# Patient Record
Sex: FEMALE | Race: WHITE | Employment: FULL TIME | URBAN - METROPOLITAN AREA
[De-identification: names, ages, dates, MRNs, and addresses within clinical notes are randomized per-mention and may not be internally consistent; named-entity substitution may affect disease eponyms.]

---

## 2017-03-03 LAB
ANTIBODY SCREEN, EXTERNAL: NEGATIVE
CHLAMYDIA, EXTERNAL: NEGATIVE
HBSAG, EXTERNAL: NEGATIVE
HIV, EXTERNAL: NEGATIVE
N. GONORRHEA, EXTERNAL: NEGATIVE
RPR, EXTERNAL: NON REACTIVE
RUBELLA, EXTERNAL: NORMAL
TYPE, ABO & RH, EXTERNAL: NORMAL

## 2017-07-11 ENCOUNTER — OFFICE VISIT (OUTPATIENT)
Dept: SURGERY | Age: 27
End: 2017-07-11

## 2017-07-11 VITALS
DIASTOLIC BLOOD PRESSURE: 73 MMHG | WEIGHT: 187.5 LBS | HEART RATE: 87 BPM | HEIGHT: 68 IN | BODY MASS INDEX: 28.42 KG/M2 | SYSTOLIC BLOOD PRESSURE: 127 MMHG

## 2017-07-11 DIAGNOSIS — R23.4 BREAST SKIN CHANGES: Primary | ICD-10-CM

## 2017-07-11 RX ORDER — FAMOTIDINE 40 MG/1
40 TABLET, FILM COATED ORAL
COMMUNITY
End: 2022-09-27

## 2017-07-11 RX ORDER — ALPRAZOLAM 0.5 MG/1
TABLET ORAL
COMMUNITY
End: 2017-09-26

## 2017-07-11 NOTE — COMMUNICATION BODY
HISTORY OF PRESENT ILLNESS  Shannan Minaya is a 28 y.o. female. HPI   NEW patient presents for consultation at the request of Dr. Guillermo Whyte for bilateral breast rash which started after she got pregnant. She is 28 weeks pregnant. She felt a mass in 2017 which was evaluated with ultrasound (BIRADS 2) and has since resolved. The rash is worse on the RIGHT. She had a skin biopsy in 2017 which was benign. She has an intermittent burning sensation to the skin and the skin on her RIGHT breast gets \"tough\" and firm. This only occurred after pregnancy. Had a skin biopsy by [facility] in 2017 which per patient revealed focal hemorrhage with mild dermal inflammation, negative for cancer. RIGHT breast ultrasound 17 at [facility], BIRADS 2, benign. OB History      Para Term  AB Living    1         SAB TAB Ectopic Molar Multiple Live Births                 Obstetric Comments    Menarche:  14. LMP: 2016. # of Children:  0. Age at Delivery of First Child:  Due 16. Hysterectomy/oophorectomy:  NO/NO. Breast Bx:  Yes, 2017. Hx of Breast Feeding:  N/A.  BCP:  Yes,  to . Hormone therapy:  No.           Review of Systems   Constitutional: Positive for malaise/fatigue. Positive for weight gain. Eyes: Negative. Respiratory: Negative. Cardiovascular: Positive for leg swelling. Gastrointestinal: Positive for heartburn. Genitourinary: Positive for frequency. Musculoskeletal: Negative. Skin: Positive for rash. Neurological: Positive for headaches. Endo/Heme/Allergies: Bruises/bleeds easily. Psychiatric/Behavioral: The patient is nervous/anxious. Physical Exam   Pulmonary/Chest: Right breast exhibits no mass, no nipple discharge and no tenderness. Skin change: RIGHT > LEFT,  the skin is thick and slightly discolored. Left breast exhibits skin change.  Left breast exhibits no mass, no nipple discharge and no tenderness. Breasts are symmetrical.       Lymphadenopathy:     She has no cervical adenopathy. She has no axillary adenopathy. Right: No supraclavicular adenopathy present. Left: No supraclavicular adenopathy present. ASSESSMENT and PLAN    ICD-10-CM ICD-9-CM    1. Breast skin changes R23.4 782.8      Bilateral breast skin changes. No worrisome finding on physical exam or ultrasound. I think these changes are due to hormonal breast changes with pregnancy.   PRN follow up

## 2017-07-11 NOTE — PATIENT INSTRUCTIONS
Breast Lumps: Care Instructions  Your Care Instructions  Breast lumps are common, especially in women between ages 27 and 48. Many womens breasts feel lumpy and tender before their menstrual period. Women also may have lumps when they are breastfeeding. Breast lumps may go away after menopause. All new breast lumps in women after menopause should be checked by a doctor. Although lumps may be normal for you, it is important to have your doctor check any lump or thickness that is not like the rest of your breast to make sure it is not cancer. A lump may be larger, harder, or different from the rest of your breast tissue. Follow-up care is a key part of your treatment and safety. Be sure to make and go to all appointments, and call your doctor if you are having problems. Its also a good idea to know your test results and keep a list of the medicines you take. How can you care for yourself at home? · Make an appointment to have a mammogram and other follow-up visits as recommended by your doctor. When should you call for help? Call your doctor now or seek immediate medical care if:  · You have new changes in a breast, such as:  ¨ A lump or thickening in your breast or armpit. ¨ A change in the breast's size or shape. ¨ Skin changes, such as dimples or puckers. ¨ Nipple discharge. ¨ A change in the color or feel of the skin of your breast or the darker area around the nipple (areola). ¨ A change in the shape of the nipple (it may look like it's being pulled into the breast). · You have symptoms of a breast infection, such as:  ¨ Increased pain, swelling, redness, or warmth around a breast.  ¨ Red streaks extending from the breast.  ¨ Pus draining from a breast.  ¨ A fever. Watch closely for changes in your health, and be sure to contact your doctor if:  · You do not get better as expected. Where can you learn more? Go to http://riley-radha.info/.   Enter D305 in the search box to learn more about \"Breast Lumps: Care Instructions. \"  Current as of: October 13, 2016  Content Version: 11.3  © 7647-5072 [employer]. Care instructions adapted under license by [employer] (which disclaims liability or warranty for this information). If you have questions about a medical condition or this instruction, always ask your healthcare professional. [address] any warranty or liability for your use of this information.

## 2017-07-11 NOTE — PROGRESS NOTES
HISTORY OF PRESENT ILLNESS  Willie Peterson is a 28 y.o. female. HPI   NEW patient presents for consultation at the request of Dr. Alberta Ruvalcaba for bilateral breast rash which started after she got pregnant. She is 28 weeks pregnant. She felt a mass in 2017 which was evaluated with ultrasound (BIRADS 2) and has since resolved. The rash is worse on the RIGHT. She had a skin biopsy in 2017 which was benign. She has an intermittent burning sensation to the skin and the skin on her RIGHT breast gets \"tough\" and firm. This only occurred after pregnancy. Had a skin biopsy by [facility] in 2017 which per patient revealed focal hemorrhage with mild dermal inflammation, negative for cancer. RIGHT breast ultrasound 17 at [facility], BIRADS 2, benign. OB History      Para Term  AB Living    1         SAB TAB Ectopic Molar Multiple Live Births                 Obstetric Comments    Menarche:  14. LMP: 2016. # of Children:  0. Age at Delivery of First Child:  Due 16. Hysterectomy/oophorectomy:  NO/NO. Breast Bx:  Yes, 2017. Hx of Breast Feeding:  N/A.  BCP:  Yes,  to . Hormone therapy:  No.           Review of Systems   Constitutional: Positive for malaise/fatigue. Positive for weight gain. Eyes: Negative. Respiratory: Negative. Cardiovascular: Positive for leg swelling. Gastrointestinal: Positive for heartburn. Genitourinary: Positive for frequency. Musculoskeletal: Negative. Skin: Positive for rash. Neurological: Positive for headaches. Endo/Heme/Allergies: Bruises/bleeds easily. Psychiatric/Behavioral: The patient is nervous/anxious. Physical Exam   Pulmonary/Chest: Right breast exhibits no mass, no nipple discharge and no tenderness. Skin change: RIGHT > LEFT,  the skin is thick and slightly discolored. Left breast exhibits skin change.  Left breast exhibits no mass, no nipple discharge and no tenderness. Breasts are symmetrical.       Lymphadenopathy:     She has no cervical adenopathy. She has no axillary adenopathy. Right: No supraclavicular adenopathy present. Left: No supraclavicular adenopathy present. ASSESSMENT and PLAN    ICD-10-CM ICD-9-CM    1. Breast skin changes R23.4 782.8      Bilateral breast skin changes. No worrisome finding on physical exam or ultrasound. I think these changes are due to hormonal breast changes with pregnancy.   PRN follow up

## 2017-09-06 LAB — GRBS, EXTERNAL: POSITIVE

## 2017-09-21 ENCOUNTER — HOSPITAL ENCOUNTER (OUTPATIENT)
Age: 27
Discharge: HOME | End: 2017-09-21
Attending: OBSTETRICS & GYNECOLOGY | Admitting: OBSTETRICS & GYNECOLOGY
Payer: COMMERCIAL

## 2017-09-21 VITALS
WEIGHT: 207 LBS | RESPIRATION RATE: 16 BRPM | SYSTOLIC BLOOD PRESSURE: 122 MMHG | DIASTOLIC BLOOD PRESSURE: 84 MMHG | BODY MASS INDEX: 31.37 KG/M2 | HEIGHT: 68 IN | TEMPERATURE: 98 F | HEART RATE: 86 BPM

## 2017-09-21 PROBLEM — O99.891 BACK PAIN AFFECTING PREGNANCY IN THIRD TRIMESTER: Status: ACTIVE | Noted: 2017-09-21

## 2017-09-21 PROBLEM — M54.9 BACK PAIN AFFECTING PREGNANCY IN THIRD TRIMESTER: Status: ACTIVE | Noted: 2017-09-21

## 2017-09-21 LAB
APPEARANCE UR: CLEAR
BACTERIA URNS QL MICRO: ABNORMAL /HPF
BILIRUB UR QL: NEGATIVE
COLOR UR: ABNORMAL
EPITH CASTS URNS QL MICRO: ABNORMAL /LPF
GLUCOSE UR STRIP.AUTO-MCNC: NEGATIVE MG/DL
HGB UR QL STRIP: NEGATIVE
KETONES UR QL STRIP.AUTO: 15 MG/DL
LEUKOCYTE ESTERASE UR QL STRIP.AUTO: ABNORMAL
NITRITE UR QL STRIP.AUTO: NEGATIVE
PH UR STRIP: 6.5 [PH] (ref 5–8)
PROT UR STRIP-MCNC: NEGATIVE MG/DL
RBC #/AREA URNS HPF: ABNORMAL /HPF (ref 0–5)
SP GR UR REFRACTOMETRY: 1 (ref 1–1.03)
UA: UC IF INDICATED,UAUC: ABNORMAL
UROBILINOGEN UR QL STRIP.AUTO: 0.2 EU/DL (ref 0.2–1)
WBC URNS QL MICRO: ABNORMAL /HPF (ref 0–4)

## 2017-09-21 PROCEDURE — 87086 URINE CULTURE/COLONY COUNT: CPT | Performed by: OBSTETRICS & GYNECOLOGY

## 2017-09-21 PROCEDURE — 81001 URINALYSIS AUTO W/SCOPE: CPT | Performed by: OBSTETRICS & GYNECOLOGY

## 2017-09-21 PROCEDURE — 74011250637 HC RX REV CODE- 250/637: Performed by: OBSTETRICS & GYNECOLOGY

## 2017-09-21 PROCEDURE — 99285 EMERGENCY DEPT VISIT HI MDM: CPT

## 2017-09-21 RX ORDER — CYCLOBENZAPRINE HCL 10 MG
5 TABLET ORAL
Qty: 15 TAB | Refills: 0 | Status: SHIPPED | OUTPATIENT
Start: 2017-09-21 | End: 2017-09-26

## 2017-09-21 RX ORDER — CYCLOBENZAPRINE HCL 10 MG
5 TABLET ORAL
Status: DISCONTINUED | OUTPATIENT
Start: 2017-09-21 | End: 2017-09-21 | Stop reason: HOSPADM

## 2017-09-21 RX ADMIN — CYCLOBENZAPRINE HYDROCHLORIDE 5 MG: 10 TABLET, FILM COATED ORAL at 17:53

## 2017-09-21 NOTE — H&P
History & Physical    Name: Katelyn Hong MRN: [id number]  SSN: [id number]    YOB: 1990  Age: 28 y.o. Sex: female        Subjective:     Estimated Date of Delivery: 17  OB History    Para Term  AB Living   1        SAB TAB Ectopic Molar Multiple Live Births              # Outcome Date GA Lbr Kvng/2nd Weight Sex Delivery Anes PTL Lv   1 Current               Obstetric Comments   Menarche:  14. LMP: 2016. # of Children:  0. Age at Delivery of First Child:  Due 16. Hysterectomy/oophorectomy:  NO/NO. Breast Bx:  Yes, 2017. Hx of Breast Feeding:  N/A.  BCP:  Yes,  to . Hormone therapy:  No.        Ms. Nafisa Fitzpatrick is admitted with pregnancy at 38w6d for possible labor. C/o R spasm of back. Prenatal course is noteable for breech baby. Please see prenatal records for details. Past Medical History:   Diagnosis Date    MRSA (methicillin resistant staph aureus) culture positive     Psychiatric problem     anxiety - not currently medicated    Syncope      Past Surgical History:   Procedure Laterality Date    HX OTHER SURGICAL      wisdom teeth removed     Social History     Occupational History    Not on file. Social History Main Topics    Smoking status: Never Smoker    Smokeless tobacco: Never Used    Alcohol use No    Drug use: No    Sexual activity: Yes     Partners: Male     Birth control/ protection: None     Family History   Problem Relation Age of Onset    Hypertension Father        Allergies   Allergen Reactions    Bactrim [Sulfamethoprim] Rash     Prior to Admission medications    Medication Sig Start Date End Date Taking? Authorizing Provider   PNV no.106-iron-folate no6-dha 27.5 mg iron- 1 mg cap Take  by mouth. Yes Historical Provider   famotidine (PEPCID) 40 mg tablet Take 40 mg by mouth daily. Yes Historical Provider   ALPRAZolam Alfornia Hettinger) 0.5 mg tablet Take  by mouth.     Historical Provider        Review of Systems: A comprehensive review of systems was negative except for that written in the HPI. Objective:     Vitals:  Vitals:    17 1542 17 1550   BP:  122/84   Pulse:  86   Resp:  16   Temp:  98 °F (36.7 °C)   Weight: 93.9 kg (207 lb)    Height: 5' 8\" (1.727 m)         Physical Exam:  Patient without distress. Back: costovertebral angle tenderness absent  Abdomen: soft, nontender  Fundus: soft and non tender  Perineum: blood absent, amniotic fluid absent  Cervical Exam: ftp/long/high  Lower Extremities:  - Edema No  Membranes:  Intact  Fetal Heart Rate: 150s mod +accels no decels  Rillito: irregular ctx  Bedside US breech  Prenatal Labs:   No results found for: ABORH, RUBELLAEXT, GRBSEXT, HBSAGEXT, HIVEXT, RPREXT, GONNOEXT, CHLAMEXT, ABORHEXT, RUBELLAEXT, GRBSEXT, HBSAGEXT, HIVEXT, RPREXT, GONNOEXT, CHLAMEXT    No results found for this or any previous visit (from the past 12 hour(s)). Assessment/Plan:     Active Problems:    Back pain affecting pregnancy in third trimester (2017)         Plan:  38w6d with back spasm - will try flexeril. Encouraged heat/ice. Labor precautions reviewed. FHT reassuring. UA pending - if WNL plan for discharge under current plan.   Signed By:  Amirah Urbina MD     2017

## 2017-09-21 NOTE — DISCHARGE INSTRUCTIONS
Backache During Pregnancy: Care Instructions  Your Care Instructions  Back pain has many possible causes. It is often caused by problems with muscles and ligaments in your back. The extra weight during pregnancy can put stress on your back. Moving, lifting, standing, sitting, or sleeping in an awkward way also can strain your back. Back pain can also be a sign of labor. Although it may hurt a lot, back pain often improves on its own. Use good home treatment, and take care not to stress your back. Follow-up care is a key part of your treatment and safety. Be sure to make and go to all appointments, and call your doctor if you are having problems. It's also a good idea to know your test results and keep a list of the medicines you take. How can you care for yourself at home? · Ask your doctor about taking acetaminophen (Tylenol) for pain. Do not take aspirin, ibuprofen (Advil, Motrin), or naproxen (Aleve). · Do not take two or more pain medicines at the same time unless the doctor told you to. Many pain medicines have acetaminophen, which is Tylenol. Too much acetaminophen (Tylenol) can be harmful. · Lie on your side with your knees and hips bent and a pillow between your legs. This reduces stress on your back. · Put ice or cold packs on your back for 10 to 20 minutes at a time, several times a day. Put a thin cloth between the ice and your skin. · Warm baths may also help reduce pain. · Change positions every 30 minutes. Take breaks if you must sit for a long time. Get up and walk around. · Ask your doctor about how much exercise you can do. You may feel better taking short walks or doing gentle movements and stretching in a swimming pool. · Ask your doctor about exercises to stretch and strengthen your back. When should you call for help? Call your doctor now or seek immediate medical care if:  · You have had regular contractions (with or without pain) for an hour.  This means that you have 8 or more within 1 hour or 4 or more in 20 minutes after you change your position and drink fluids. · You have new numbness in your buttocks, genital or rectal areas, or legs. · You have a new loss of bowel or bladder control. · You have new or worse back pain. Watch closely for changes in your health, and be sure to contact your doctor if:  · You do not get better as expected. Where can you learn more? Go to http://riley-radha.info/. Enter X761 in the search box to learn more about \"Backache During Pregnancy: Care Instructions. \"  Current as of: March 16, 2017  Content Version: 11.3  © 7317-8811 [employer]. Care instructions adapted under license by [employer] (which disclaims liability or warranty for this information). If you have questions about a medical condition or this instruction, always ask your healthcare professional. [place] [address] any warranty or liability for your use of this information. DISCHARGE SUMMARY from Nurse    The following personal items are in your possession at time of discharge:    Dental Appliances: None  Visual Aid: Contacts, With patient, Glasses     Home Medications: None  Jewelry: Earrings, With patient  Clothing: Undergarments, Footwear, Shirt, Pants, With patient  Other Valuables: Cell Phone, Connie Chamberlain, With patient  Personal Items Sent to Safe: none          PATIENT INSTRUCTIONS:    After general anesthesia or intravenous sedation, for 24 hours or while taking prescription Narcotics:  · Limit your activities  · Do not drive and operate hazardous machinery  · Do not make important personal or business decisions  · Do  not drink alcoholic beverages  · If you have not urinated within 8 hours after discharge, please contact your surgeon on call.     Report the following to your surgeon:  · Excessive pain, swelling, redness or odor of or around the surgical area  · Temperature over 100.5  · Nausea and vomiting lasting longer than 4 hours or if unable to take medications  · Any signs of decreased circulation or nerve impairment to extremity: change in color, persistent  numbness, tingling, coldness or increase pain  · Any questions        What to do at Home:  Recommended activity: Activity as tolerated,       *  Please give a list of your current medications to your Primary Care Provider. *  Please update this list whenever your medications are discontinued, doses are      changed, or new medications (including over-the-counter products) are added. *  Please carry medication information at all times in case of emergency situations. These are general instructions for a healthy lifestyle:    No smoking/ No tobacco products/ Avoid exposure to second hand smoke    Surgeon General's Warning:  Quitting smoking now greatly reduces serious risk to your health. Obesity, smoking, and sedentary lifestyle greatly increases your risk for illness    A healthy diet, regular physical exercise & weight monitoring are important for maintaining a healthy lifestyle    You may be retaining fluid if you have a history of heart failure or if you experience any of the following symptoms:  Weight gain of 3 pounds or more overnight or 5 pounds in a week, increased swelling in our hands or feet or shortness of breath while lying flat in bed. Please call your doctor as soon as you notice any of these symptoms; do not wait until your next office visit. Recognize signs and symptoms of STROKE:    F-face looks uneven    A-arms unable to move or move unevenly    S-speech slurred or non-existent    T-time-call 911 as soon as signs and symptoms begin-DO NOT go       Back to bed or wait to see if you get better-TIME IS BRAIN. Warning Signs of HEART ATTACK     Call 911 if you have these symptoms:   Chest discomfort.  Most heart attacks involve discomfort in the center of the chest that lasts more than a few minutes, or that goes away and comes back. It can feel like uncomfortable pressure, squeezing, fullness, or pain.  Discomfort in other areas of the upper body. Symptoms can include pain or discomfort in one or both arms, the back, neck, jaw, or stomach.  Shortness of breath with or without chest discomfort.  Other signs may include breaking out in a cold sweat, nausea, or lightheadedness. Don't wait more than five minutes to call 911 - MINUTES MATTER! Fast action can save your life. Calling 911 is almost always the fastest way to get lifesaving treatment. Emergency Medical Services staff can begin treatment when they arrive -- up to an hour sooner than if someone gets to the hospital by car. The discharge information has been reviewed with the patient. The patient verbalized understanding. Discharge medications reviewed with the patient and appropriate educational materials and side effects teaching were provided.

## 2017-09-21 NOTE — IP AVS SNAPSHOT
[address] 
[phone] Patient: Jose White MRN: [id number] Current Discharge Medication List  
  
START taking these medications Dose & Instructions Dispensing Information Comments Morning Noon Evening Bedtime  
 cyclobenzaprine 10 mg tablet Commonly known as:  FLEXERIL Your last dose was: Your next dose is:    
   
   
 Dose:  5 mg Take 0.5 Tabs by mouth three (3) times daily as needed for Muscle Spasm(s). Quantity:  15 Tab Refills:  0 CONTINUE these medications which have NOT CHANGED Dose & Instructions Dispensing Information Comments Morning Noon Evening Bedtime PEPCID 40 mg tablet Generic drug:  famotidine Your last dose was: Your next dose is:    
   
   
 Dose:  40 mg Take 40 mg by mouth daily. Refills:  0  
     
   
   
   
  
 PNV no.106-iron-folate no6-dha 27.5 mg iron- 1 mg Cap Your last dose was: Your next dose is: Take  by mouth. Refills:  0  
     
   
   
   
  
 XANAX 0.5 mg tablet Generic drug:  ALPRAZolam  
   
Your last dose was: Your next dose is: Take  by mouth. Refills:  0 Where to Get Your Medications Information on where to get these meds will be given to you by the nurse or doctor. ! Ask your nurse or doctor about these medications  
  cyclobenzaprine 10 mg tablet

## 2017-09-21 NOTE — IP AVS SNAPSHOT
[address] 
[phone] Patient: Arden Ibrahim MRN: [id number] ICY: You are allergic to the following Allergen Reactions Bactrim (Sulfamethoprim) Rash Recent Documentation Height Weight Breastfeeding? BMI OB Status Smoking Status 1.727 m 93.9 kg No 31.47 kg/m2 Pregnant Never Smoker Unresulted Labs Order Current Status CULTURE, MRSA In process URINALYSIS W/ REFLEX CULTURE Preliminary result Emergency Contacts Name Discharge Info Relation Home Work Mobile Jose Castilloist  [place] [17] [phone] About your hospitalization You were admitted on:  2017 You last received care in the:  [address] [place] [address] You were discharged on:  2017 Unit phone number:  [phone] Why you were hospitalized Your primary diagnosis was:  Not on File Your diagnoses also included:  Back Pain Affecting Pregnancy In Third Trimester Providers Seen During Your Hospitalizations Provider Role Specialty Primary office phone Yomaira Garrison [place] Attending Provider Obstetrics & Gynecology [phone] Your Primary Care Physician (PCP) Primary Care Physician Office Phone Office Fax Michiel Curling [phone] Follow-up Information Follow up With Details Comments Contact Info BESSY Mina   [facility] [address] [place] [address] 
[phone] Your Appointments 2017  8:00 AM EDT  
L&D PAT with [facility] L&D PAT  
[facility] LD PAT SHADOW ([address]) [address]  
[phone] 2017  SECTION with Tamia Pavon. Lambert Garrison MD  
[facility] 3 LABOR & DELIVERY (--)  
 [address]  
[phone] Current Discharge Medication List  
  
 START taking these medications Dose & Instructions Dispensing Information Comments Morning Noon Evening Bedtime  
 cyclobenzaprine 10 mg tablet Commonly known as:  FLEXERIL Your last dose was: Your next dose is:    
   
   
 Dose:  5 mg Take 0.5 Tabs by mouth three (3) times daily as needed for Muscle Spasm(s). Quantity:  15 Tab Refills:  0 CONTINUE these medications which have NOT CHANGED Dose & Instructions Dispensing Information Comments Morning Noon Evening Bedtime PEPCID 40 mg tablet Generic drug:  famotidine Your last dose was: Your next dose is:    
   
   
 Dose:  40 mg Take 40 mg by mouth daily. Refills:  0  
     
   
   
   
  
 PNV no.106-iron-folate no6-dha 27.5 mg iron- 1 mg Cap Your last dose was: Your next dose is: Take  by mouth. Refills:  0  
     
   
   
   
  
 XANAX 0.5 mg tablet Generic drug:  ALPRAZolam  
   
Your last dose was: Your next dose is: Take  by mouth. Refills:  0 Where to Get Your Medications Information on where to get these meds will be given to you by the nurse or doctor. ! Ask your nurse or doctor about these medications  
  cyclobenzaprine 10 mg tablet Discharge Instructions Backache During Pregnancy: Care Instructions Your Care Instructions Back pain has many possible causes. It is often caused by problems with muscles and ligaments in your back. The extra weight during pregnancy can put stress on your back. Moving, lifting, standing, sitting, or sleeping in an awkward way also can strain your back. Back pain can also be a sign of labor. Although it may hurt a lot, back pain often improves on its own. Use good home treatment, and take care not to stress your back. Follow-up care is a key part of your treatment and safety.  Be sure to make and go to all appointments, and call your doctor if you are having problems. It's also a good idea to know your test results and keep a list of the medicines you take. How can you care for yourself at home? · Ask your doctor about taking acetaminophen (Tylenol) for pain. Do not take aspirin, ibuprofen (Advil, Motrin), or naproxen (Aleve). · Do not take two or more pain medicines at the same time unless the doctor told you to. Many pain medicines have acetaminophen, which is Tylenol. Too much acetaminophen (Tylenol) can be harmful. · Lie on your side with your knees and hips bent and a pillow between your legs. This reduces stress on your back. · Put ice or cold packs on your back for 10 to 20 minutes at a time, several times a day. Put a thin cloth between the ice and your skin. · Warm baths may also help reduce pain. · Change positions every 30 minutes. Take breaks if you must sit for a long time. Get up and walk around. · Ask your doctor about how much exercise you can do. You may feel better taking short walks or doing gentle movements and stretching in a swimming pool. · Ask your doctor about exercises to stretch and strengthen your back. When should you call for help? Call your doctor now or seek immediate medical care if: 
· You have had regular contractions (with or without pain) for an hour. This means that you have 8 or more within 1 hour or 4 or more in 20 minutes after you change your position and drink fluids. · You have new numbness in your buttocks, genital or rectal areas, or legs. · You have a new loss of bowel or bladder control. · You have new or worse back pain. Watch closely for changes in your health, and be sure to contact your doctor if: 
· You do not get better as expected. Where can you learn more? Go to http://riley-radha.info/. Enter I010 in the search box to learn more about \"Backache During Pregnancy: Care Instructions. \" 
 Current as of: March 16, 2017 Content Version: 11.3 © 9365-5264 [employer]. Care instructions adapted under license by [employer] (which disclaims liability or warranty for this information). If you have questions about a medical condition or this instruction, always ask your healthcare professional. [address] any warranty or liability for your use of this information. DISCHARGE SUMMARY from Nurse The following personal items are in your possession at time of discharge: 
 
Dental Appliances: None Visual Aid: Contacts, With patient, Glasses Home Medications: None Jewelry: Earrings, With patient Clothing: Undergarments, Footwear, Shirt, Pants, With patient Other Valuables: Cell Phone, [employer], With patient Personal Items Sent to Safe: none PATIENT INSTRUCTIONS: 
 
 
F-face looks uneven A-arms unable to move or move unevenly S-speech slurred or non-existent T-time-call 911 as soon as signs and symptoms begin-DO NOT go Back to bed or wait to see if you get better-TIME IS BRAIN. Warning Signs of HEART ATTACK Call 911 if you have these symptoms: 
? Chest discomfort. Most heart attacks involve discomfort in the center of the chest that lasts more than a few minutes, or that goes away and comes back. It can feel like uncomfortable pressure, squeezing, fullness, or pain. ? Discomfort in other areas of the upper body. Symptoms can include pain or discomfort in one or both arms, the back, neck, jaw, or stomach. ? Shortness of breath with or without chest discomfort. ? Other signs may include breaking out in a cold sweat, nausea, or lightheadedness. Don't wait more than five minutes to call [address]! Fast action can save your life.  Calling 911 is almost always the fastest way to get lifesaving treatment. Emergency Medical Services staff can begin treatment when they arrive  up to an hour sooner than if someone gets to the hospital by car. The discharge information has been reviewed with the patient. The patient verbalized understanding. Discharge medications reviewed with the patient and appropriate educational materials and side effects teaching were provided. Discharge Orders None Introducing [facility] SERVICES! Brent Delvalle introduces iJoule patient portal. Now you can access parts of your medical record, email your doctor's office, and request medication refills online. 1. In your internet browser, go to [email or web address] 2. Click on the First Time User? Click Here link in the Sign In box. You will see the New Member Sign Up page. 3. Enter your iJoule Access Code exactly as it appears below. You will not need to use this code after youve completed the sign-up process. If you do not sign up before the expiration date, you must request a new code. · iJ[employer]le Access Code: [email or web address] Expires: 10/9/2017  2:51 PM 
 
4. Enter the last four digits of your Social Security Number (xxxx) and Date of Birth (mm/dd/yyyy) as indicated and click Submit. You will be taken to the next sign-up page. 5. Create a iJoule ID. This will be your iJoule login ID and cannot be changed, so think of one that is secure and easy to remember. 6. Create a iJoule password. You can change your password at any time. 7. Enter your Password Reset Question and Answer. This can be used at a later time if you forget your password. 8. Enter your e-mail address. You will receive e-mail notification when new information is available in [address] 9. Click Sign Up. You can now view and download portions of your medical record. 10. Click the Download Summary menu link to download a portable copy of your medical information. If you have questions, please visit the Frequently Asked Questions section of the MyChart website. Remember, MyChart is NOT to be used for urgent needs. For medical emergencies, dial 911. Now available from your iPhone and Android! General Information Please provide this summary of care documentation to your next provider. Patient Signature:  ____________________________________________________________ Date:  ____________________________________________________________  
  
[facility] Provider Signature:  ____________________________________________________________ Date:  ____________________________________________________________

## 2017-09-21 NOTE — PROGRESS NOTES
1527 Pt admitted to LDR 8 with c/o of low back back and general feeling of discomfort. Pain has increased in severity throughout the day, concentrated on her right flank. 26 Dr Dion Wolff at bedside to assess. Breech confirmed via bedside US. 7 [facility] reviewed by Dr Dion Wolff. Ok to d/c pt to home. 1805 Pt given d/c instructions. Verbalized understanding. 1807 Pt left unit ambulatory with .

## 2017-09-22 LAB
BACTERIA SPEC CULT: NORMAL
BACTERIA SPEC CULT: NORMAL
SERVICE CMNT-IMP: NORMAL

## 2017-09-23 ENCOUNTER — HOSPITAL ENCOUNTER (INPATIENT)
Age: 27
LOS: 3 days | Discharge: HOME | End: 2017-09-26
Attending: OBSTETRICS & GYNECOLOGY | Admitting: OBSTETRICS & GYNECOLOGY
Payer: COMMERCIAL

## 2017-09-23 ENCOUNTER — ANESTHESIA EVENT (OUTPATIENT)
Dept: LABOR AND DELIVERY | Age: 27
End: 2017-09-23
Payer: COMMERCIAL

## 2017-09-23 ENCOUNTER — ANESTHESIA (OUTPATIENT)
Dept: LABOR AND DELIVERY | Age: 27
End: 2017-09-23
Payer: COMMERCIAL

## 2017-09-23 LAB
BACTERIA SPEC CULT: NORMAL
CC UR VC: NORMAL
ERYTHROCYTE [DISTWIDTH] IN BLOOD BY AUTOMATED COUNT: 13.9 % (ref 11.5–14.5)
HCT VFR BLD AUTO: 43.9 % (ref 35–47)
HGB BLD-MCNC: 15.3 G/DL (ref 11.5–16)
MCH RBC QN AUTO: 32 PG (ref 26–34)
MCHC RBC AUTO-ENTMCNC: 34.9 G/DL (ref 30–36.5)
MCV RBC AUTO: 91.8 FL (ref 80–99)
PLATELET # BLD AUTO: 230 K/UL (ref 150–400)
RBC # BLD AUTO: 4.78 M/UL (ref 3.8–5.2)
SERVICE CMNT-IMP: NORMAL
WBC # BLD AUTO: 16.9 K/UL (ref 3.6–11)

## 2017-09-23 PROCEDURE — 85027 COMPLETE CBC AUTOMATED: CPT | Performed by: OBSTETRICS & GYNECOLOGY

## 2017-09-23 PROCEDURE — 74011250636 HC RX REV CODE- 250/636

## 2017-09-23 PROCEDURE — 86870 RBC ANTIBODY IDENTIFICATION: CPT | Performed by: OBSTETRICS & GYNECOLOGY

## 2017-09-23 PROCEDURE — 75410000003 HC RECOV DEL/VAG/CSECN EA 0.5 HR: Performed by: OBSTETRICS & GYNECOLOGY

## 2017-09-23 PROCEDURE — 86921 COMPATIBILITY TEST INCUBATE: CPT | Performed by: OBSTETRICS & GYNECOLOGY

## 2017-09-23 PROCEDURE — 4A1H74Z MONITORING OF PRODUCTS OF CONCEPTION, CARDIAC ELECTRICAL ACTIVITY, VIA NATURAL OR ARTIFICIAL OPENING: ICD-10-PCS | Performed by: OBSTETRICS & GYNECOLOGY

## 2017-09-23 PROCEDURE — 77030012890

## 2017-09-23 PROCEDURE — 86900 BLOOD TYPING SEROLOGIC ABO: CPT | Performed by: OBSTETRICS & GYNECOLOGY

## 2017-09-23 PROCEDURE — 77030011943

## 2017-09-23 PROCEDURE — 74011000250 HC RX REV CODE- 250

## 2017-09-23 PROCEDURE — 74011250636 HC RX REV CODE- 250/636: Performed by: ANESTHESIOLOGY

## 2017-09-23 PROCEDURE — 86922 COMPATIBILITY TEST ANTIGLOB: CPT | Performed by: OBSTETRICS & GYNECOLOGY

## 2017-09-23 PROCEDURE — 74011250637 HC RX REV CODE- 250/637: Performed by: OBSTETRICS & GYNECOLOGY

## 2017-09-23 PROCEDURE — 77030034849

## 2017-09-23 PROCEDURE — 76060000078 HC EPIDURAL ANESTHESIA: Performed by: OBSTETRICS & GYNECOLOGY

## 2017-09-23 PROCEDURE — 77030007866 HC KT SPN ANES BBMI -B: Performed by: ANESTHESIOLOGY

## 2017-09-23 PROCEDURE — 36415 COLL VENOUS BLD VENIPUNCTURE: CPT | Performed by: OBSTETRICS & GYNECOLOGY

## 2017-09-23 PROCEDURE — 86920 COMPATIBILITY TEST SPIN: CPT | Performed by: OBSTETRICS & GYNECOLOGY

## 2017-09-23 PROCEDURE — 76010000392 HC C SECN EA ADDL 0.5 HR: Performed by: OBSTETRICS & GYNECOLOGY

## 2017-09-23 PROCEDURE — 74011250636 HC RX REV CODE- 250/636: Performed by: OBSTETRICS & GYNECOLOGY

## 2017-09-23 PROCEDURE — 65410000002 HC RM PRIVATE OB

## 2017-09-23 PROCEDURE — 76010000391 HC C SECN FIRST 1 HR: Performed by: OBSTETRICS & GYNECOLOGY

## 2017-09-23 RX ORDER — TERBUTALINE SULFATE 1 MG/ML
0.25 INJECTION SUBCUTANEOUS AS NEEDED
Status: DISCONTINUED | OUTPATIENT
Start: 2017-09-23 | End: 2017-09-23 | Stop reason: HOSPADM

## 2017-09-23 RX ORDER — OXYTOCIN/RINGER'S LACTATE 20/1000 ML
125-1000 PLASTIC BAG, INJECTION (ML) INTRAVENOUS
Status: DISCONTINUED | OUTPATIENT
Start: 2017-09-23 | End: 2017-09-23 | Stop reason: HOSPADM

## 2017-09-23 RX ORDER — ONDANSETRON 2 MG/ML
4 INJECTION INTRAMUSCULAR; INTRAVENOUS ONCE
Status: ACTIVE | OUTPATIENT
Start: 2017-09-23 | End: 2017-09-23

## 2017-09-23 RX ORDER — KETOROLAC TROMETHAMINE 30 MG/ML
30 INJECTION, SOLUTION INTRAMUSCULAR; INTRAVENOUS
Status: DISPENSED | OUTPATIENT
Start: 2017-09-23 | End: 2017-09-24

## 2017-09-23 RX ORDER — SODIUM CHLORIDE, SODIUM LACTATE, POTASSIUM CHLORIDE, CALCIUM CHLORIDE 600; 310; 30; 20 MG/100ML; MG/100ML; MG/100ML; MG/100ML
INJECTION, SOLUTION INTRAVENOUS
Status: DISCONTINUED | OUTPATIENT
Start: 2017-09-23 | End: 2017-09-23 | Stop reason: HOSPADM

## 2017-09-23 RX ORDER — MORPHINE SULFATE 10 MG/ML
6 INJECTION, SOLUTION INTRAMUSCULAR; INTRAVENOUS
Status: DISCONTINUED | OUTPATIENT
Start: 2017-09-23 | End: 2017-09-26 | Stop reason: HOSPADM

## 2017-09-23 RX ORDER — SODIUM CHLORIDE 0.9 % (FLUSH) 0.9 %
SYRINGE (ML) INJECTION
Status: COMPLETED
Start: 2017-09-23 | End: 2017-09-23

## 2017-09-23 RX ORDER — METHYLERGONOVINE MALEATE 0.2 MG/ML
INJECTION INTRAVENOUS AS NEEDED
Status: DISCONTINUED | OUTPATIENT
Start: 2017-09-23 | End: 2017-09-23 | Stop reason: HOSPADM

## 2017-09-23 RX ORDER — OXYTOCIN/RINGER'S LACTATE 20/1000 ML
PLASTIC BAG, INJECTION (ML) INTRAVENOUS
Status: DISCONTINUED | OUTPATIENT
Start: 2017-09-23 | End: 2017-09-23 | Stop reason: HOSPADM

## 2017-09-23 RX ORDER — METHYLERGONOVINE MALEATE 0.2 MG/ML
0.2 INJECTION INTRAVENOUS ONCE
Status: DISPENSED | OUTPATIENT
Start: 2017-09-23 | End: 2017-09-23

## 2017-09-23 RX ORDER — NALBUPHINE HYDROCHLORIDE 10 MG/ML
2.5 INJECTION, SOLUTION INTRAMUSCULAR; INTRAVENOUS; SUBCUTANEOUS
Status: DISCONTINUED | OUTPATIENT
Start: 2017-09-23 | End: 2017-09-26 | Stop reason: HOSPADM

## 2017-09-23 RX ORDER — BUPIVACAINE HYDROCHLORIDE 7.5 MG/ML
INJECTION, SOLUTION EPIDURAL; RETROBULBAR AS NEEDED
Status: DISCONTINUED | OUTPATIENT
Start: 2017-09-23 | End: 2017-09-23 | Stop reason: HOSPADM

## 2017-09-23 RX ORDER — SODIUM CHLORIDE 0.9 % (FLUSH) 0.9 %
5-10 SYRINGE (ML) INJECTION EVERY 8 HOURS
Status: DISCONTINUED | OUTPATIENT
Start: 2017-09-23 | End: 2017-09-23 | Stop reason: HOSPADM

## 2017-09-23 RX ORDER — SODIUM CHLORIDE 0.9 % (FLUSH) 0.9 %
SYRINGE (ML) INJECTION
Status: DISPENSED
Start: 2017-09-23 | End: 2017-09-23

## 2017-09-23 RX ORDER — FENTANYL CITRATE 50 UG/ML
100 INJECTION, SOLUTION INTRAMUSCULAR; INTRAVENOUS
Status: DISCONTINUED | OUTPATIENT
Start: 2017-09-23 | End: 2017-09-23 | Stop reason: HOSPADM

## 2017-09-23 RX ORDER — SODIUM CHLORIDE 0.9 % (FLUSH) 0.9 %
5-10 SYRINGE (ML) INJECTION AS NEEDED
Status: DISCONTINUED | OUTPATIENT
Start: 2017-09-23 | End: 2017-09-23 | Stop reason: HOSPADM

## 2017-09-23 RX ORDER — PHENYLEPHRINE 10 MG/250 ML(40 MCG/ML)IN 0.9 % SOD.CHLORIDE INTRAVENOUS
Status: DISPENSED
Start: 2017-09-23 | End: 2017-09-23

## 2017-09-23 RX ORDER — CEFAZOLIN SODIUM IN 0.9 % NACL 2 G/50 ML
2 INTRAVENOUS SOLUTION, PIGGYBACK (ML) INTRAVENOUS ONCE
Status: COMPLETED | OUTPATIENT
Start: 2017-09-23 | End: 2017-09-23

## 2017-09-23 RX ORDER — MISOPROSTOL 200 UG/1
800 TABLET ORAL ONCE
Status: ACTIVE | OUTPATIENT
Start: 2017-09-23 | End: 2017-09-23

## 2017-09-23 RX ORDER — OXYTOCIN/RINGER'S LACTATE 20/1000 ML
PLASTIC BAG, INJECTION (ML) INTRAVENOUS
Status: DISPENSED
Start: 2017-09-23 | End: 2017-09-23

## 2017-09-23 RX ORDER — ONDANSETRON 2 MG/ML
INJECTION INTRAMUSCULAR; INTRAVENOUS AS NEEDED
Status: DISCONTINUED | OUTPATIENT
Start: 2017-09-23 | End: 2017-09-23 | Stop reason: HOSPADM

## 2017-09-23 RX ORDER — MORPHINE SULFATE 10 MG/ML
10 INJECTION, SOLUTION INTRAMUSCULAR; INTRAVENOUS
Status: DISCONTINUED | OUTPATIENT
Start: 2017-09-23 | End: 2017-09-26 | Stop reason: HOSPADM

## 2017-09-23 RX ORDER — ACETAMINOPHEN 10 MG/ML
INJECTION, SOLUTION INTRAVENOUS AS NEEDED
Status: DISCONTINUED | OUTPATIENT
Start: 2017-09-23 | End: 2017-09-23 | Stop reason: HOSPADM

## 2017-09-23 RX ORDER — MISOPROSTOL 100 UG/1
TABLET ORAL AS NEEDED
Status: DISCONTINUED | OUTPATIENT
Start: 2017-09-23 | End: 2017-09-23 | Stop reason: HOSPADM

## 2017-09-23 RX ORDER — CEFAZOLIN SODIUM IN 0.9 % NACL 2 G/50 ML
INTRAVENOUS SOLUTION, PIGGYBACK (ML) INTRAVENOUS
Status: COMPLETED
Start: 2017-09-23 | End: 2017-09-23

## 2017-09-23 RX ORDER — MORPHINE SULFATE 1 MG/ML
INJECTION, SOLUTION EPIDURAL; INTRATHECAL; INTRAVENOUS AS NEEDED
Status: DISCONTINUED | OUTPATIENT
Start: 2017-09-23 | End: 2017-09-23 | Stop reason: HOSPADM

## 2017-09-23 RX ADMIN — SODIUM CHLORIDE, SODIUM LACTATE, POTASSIUM CHLORIDE, CALCIUM CHLORIDE: 600; 310; 30; 20 INJECTION, SOLUTION INTRAVENOUS at 07:22

## 2017-09-23 RX ADMIN — SODIUM CHLORIDE, SODIUM LACTATE, POTASSIUM CHLORIDE, AND CALCIUM CHLORIDE 1000 ML: 600; 310; 30; 20 INJECTION, SOLUTION INTRAVENOUS at 06:37

## 2017-09-23 RX ADMIN — ONDANSETRON 4 MG: 2 INJECTION INTRAMUSCULAR; INTRAVENOUS at 07:57

## 2017-09-23 RX ADMIN — Medication 2 G: at 07:20

## 2017-09-23 RX ADMIN — Medication: at 07:53

## 2017-09-23 RX ADMIN — KETOROLAC TROMETHAMINE 30 MG: 30 INJECTION, SOLUTION INTRAMUSCULAR at 23:34

## 2017-09-23 RX ADMIN — ACETAMINOPHEN 1000 MG: 10 INJECTION, SOLUTION INTRAVENOUS at 07:55

## 2017-09-23 RX ADMIN — KETOROLAC TROMETHAMINE 30 MG: 30 INJECTION, SOLUTION INTRAMUSCULAR at 17:17

## 2017-09-23 RX ADMIN — BUPIVACAINE HYDROCHLORIDE 11 MG: 7.5 INJECTION, SOLUTION EPIDURAL; RETROBULBAR at 07:29

## 2017-09-23 RX ADMIN — KETOROLAC TROMETHAMINE 30 MG: 30 INJECTION, SOLUTION INTRAMUSCULAR at 10:18

## 2017-09-23 RX ADMIN — CEFAZOLIN 2 G: 1 INJECTION, POWDER, FOR SOLUTION INTRAMUSCULAR; INTRAVENOUS; PARENTERAL at 07:20

## 2017-09-23 RX ADMIN — Medication 10 ML: at 23:34

## 2017-09-23 RX ADMIN — MORPHINE SULFATE 200 MCG: 1 INJECTION, SOLUTION EPIDURAL; INTRATHECAL; INTRAVENOUS at 07:29

## 2017-09-23 NOTE — OP NOTES
Section Operative Report       Patient: Farzad Patel MRN: [id number]  SSN: [id number]    YOB: 1990  Age: 28 y.o. Sex: female       Date of Procedure: 2017     Preoperative Diagnosis: IUP @ 39.1 weeks, breech, spontaneous rupture of membranes    Postoperative Diagnosis: Same, delivered    Procedure: Low Transverse Procedure(s):   SECTION    Surgeon(s): Sivakumar Howard MD    Assistant:   nurse    Anesthesia: Spinal    Estimated Blood Loss :  1000cc    Findings:   Information for the patient's :  Melvenia Channel [[id number]]   Delivery of a   Male [2] infant on 2017 at 7:51 AM. Apgars were 8 and 8. Umbilical Cord: NL, 3vc    Umbilical Cord Events: none    Placenta: expressed removal with NL appearance. Amniotic Fluid Volume: Moderate     Amniotic Fluid Description:  Meconium (particulate)         Specimens:   ID Type Source Tests Collected by Time Destination   1 :  Placenta   Sivakumar Howard MD 2017 0815 Discarded               Complications:  Mild atony    Procedure Details:    After informed consent was obtained, the patient was taken to the operating room, where Spinal anesthesia was administered and found to be adequate. Sexton catheter was placed using sterile technique. The patient was prepped and draped in the usual sterile fashion. After testing for adequate anesthesia, a Pfannenstiel incision was made and carried to the anterior rectus fascia which was nicked in the midline. This incision in the fascia was extended laterally with curved Adam Scissors. The rectus muscles were  from the fascial sheath with Bovie electrocautery. The muscles were then parted in the midline, the peritoneum was entered bluntly and stretched. The bladder blade was then inserted. The vesicouterine peritoneum was identified and entered sharply with Metzenbaum scissors. The bladder flap was then created sharply and the bladder blade was reinserted.  A low transverse uterine incision was made with the scalpel and extended laterally with blunt finger dissection. Amniotomy was performed and thick meconium was noted. The breech was delivered and the legs were flexed and easily delivered. The body was further delivered and the arms were flexed across the chest and delivered. The babys head was then delivered atraumatically. There was no extension or traction on the spine. The nose and mouth were suctioned. The cord was clamped and cut and the baby was handed off to the waiting  staff. The placenta was then extracted from the uterus. The uterus was exteriorized and cleared of all clots and debris using a moist lap sponge. The uterine incision was closed with number 1 Chromic in a running locking fashion with a second layer used to imbricate the incision. During the closure, atony was noted which did not respond to massage therefore methergine was given IM. Improvement in the uterine tone was noted. The posterior cul-de-sac was irrigated with warm normal saline. Good hemostasis of the hysterotomy was again confirmed and the uterus was returned to the abdomen. Both lateral gutters were irrigated with warm normal saline and good hemostasis was again reassured throughout, including the hysterotomy, bladder flap, rectus muscles and posterior surface of the fascia. SepraFilm was applied over the hysterotomy. The fascia was closed with 0 Vicryl in a running fashion. Good hemostasis was assured. The subcuticular layers were reapproximated with 2-0 plain gut in interrupted fashion. The skin was closed with a 4-0 Vicryl in a subcuticular fashion. The patient tolerated the procedure well. Sponge, lap, and needle counts were correct times three and the patient and baby were taken to recovery/postpartum room in stable condition. Uterine tone remained good and 800 mcg of misoprostol was given rectally at the completion of the procedure.     Signed By: Charley Okeefe MD     September 23, 2017

## 2017-09-23 NOTE — IP AVS SNAPSHOT
[address] 
[phone] Patient: Charu Doe MRN: [id number]:[id number] Current Discharge Medication List  
  
START taking these medications Dose & Instructions Dispensing Information Comments Morning Noon Evening Bedtime  
 ibuprofen 800 mg tablet Commonly known as:  MOTRIN Your last dose was: Your next dose is:    
   
   
 Dose:  800 mg Take 1 Tab by mouth every eight (8) hours as needed for Pain. Quantity:  30 Tab Refills:  1  
     
   
   
   
  
 oxyCODONE-acetaminophen 5-325 mg per tablet Commonly known as:  PERCOCET Your last dose was: Your next dose is:    
   
   
 Dose:  1 Tab Take 1 Tab by mouth every four (4) hours as needed. Max Daily Amount: 6 Tabs. Quantity:  30 Tab Refills:  0 CONTINUE these medications which have NOT CHANGED Dose & Instructions Dispensing Information Comments Morning Noon Evening Bedtime PEPCID 40 mg tablet Generic drug:  famotidine Your last dose was: Your next dose is:    
   
   
 Dose:  40 mg Take 40 mg by mouth daily. Refills:  0  
     
   
   
   
  
 PNV no.106-iron-folate no6-dha 27.5 mg iron- 1 mg Cap Your last dose was: Your next dose is: Take  by mouth. Refills:  0 STOP taking these medications   
 cyclobenzaprine 10 mg tablet Commonly known as:  FLEXERIL  
   
  
 XANAX 0.5 mg tablet Generic drug:  ALPRAZolam  
   
  
  
  
Where to Get Your Medications Information on where to get these meds will be given to you by the nurse or doctor. ! Ask your nurse or doctor about these medications  
  ibuprofen 800 mg tablet  
 oxyCODONE-acetaminophen 5-325 mg per tablet

## 2017-09-23 NOTE — ANESTHESIA PROCEDURE NOTES
Spinal Block    Start time: 9/23/2017 7:25 AM  End time: 9/23/2017 7:29 AM  Performed by: Francisco France  Authorized by: Francisco France     Pre-procedure:   Indications: primary anesthetic  Preanesthetic Checklist: patient identified, risks and benefits discussed, anesthesia consent, site marked, patient being monitored and timeout performed    Timeout Time: 07:25          Spinal Block:   Patient Position:  Seated  Prep Region:  Lumbar  Prep: Betadine and patient draped      Location:  L3-4  Technique:  Single shot  Local:  Lidocaine 1%      Needle:   Needle Type:  Pencil-tip  Needle Gauge:  25 G  Attempts:  1      Events: CSF confirmed, no blood with aspiration and no paresthesia        Assessment:  Insertion:  Uncomplicated  Patient tolerance:  Patient tolerated the procedure well with no immediate complications

## 2017-09-23 NOTE — ANESTHESIA PREPROCEDURE EVALUATION
Anesthetic History   No history of anesthetic complications            Review of Systems / Medical History  Patient summary reviewed, nursing notes reviewed and pertinent labs reviewed    Pulmonary  Within defined limits                 Neuro/Psych   Within defined limits           Cardiovascular  Within defined limits                     GI/Hepatic/Renal  Within defined limits              Endo/Other  Within defined limits           Other Findings              Physical Exam    Airway  Mallampati: II  TM Distance: > 6 cm  Neck ROM: normal range of motion   Mouth opening: Normal     Cardiovascular  Regular rate and rhythm,  S1 and S2 normal,  no murmur, click, rub, or gallop             Dental  No notable dental hx       Pulmonary  Breath sounds clear to auscultation               Abdominal  GI exam deferred       Other Findings            Anesthetic Plan    ASA: 2  Anesthesia type: spinal      Post-op pain plan if not by surgeon: intrathecal opiates      Anesthetic plan and risks discussed with: Patient

## 2017-09-23 NOTE — ROUTINE PROCESS
TRANSFER - IN REPORT:    Verbal report received from MICHELLE Palafox RN on Katelyn Hong  being received from L&D for routine post - op      Report consisted of patients Situation, Background, Assessment and   Recommendations(SBAR). Information from the following report(s) SBAR, Kardex, Intake/Output, MAR and Recent Results was reviewed with the receiving nurse. Opportunity for questions and clarification was provided. Assessment completed upon patients arrival to unit and care assumed. 1200 Hourly rounds completed on patient from 1105 to 1200.  1600 Hourly rounds completed on patient from 1200 to 1600.

## 2017-09-23 NOTE — PROGRESS NOTES
1000: Pt received to LDR 8 c/o contractions since  last night. 36: Dr. Elicia Marsh called and notified of pt's ctx pattern irregular, SVE and FHR. MD states to monitor pt for 2 hours and re-check pt.   9729: Pt c/o leaking fluid  0607: AmniSure positive  0609: Dr. Elicia Marsh called and notified of SROM. MD states start pre-op for , see orders.

## 2017-09-23 NOTE — LACTATION NOTE
This note was copied from a baby's chart. Initial Lactation Consultation - Baby born by  this morning to a  mom at 43 weeks. Mom noticed breast changes at the beginning of her pregnancy and has been able to express some colostrum. Mom states she had biopsy on her right areola during this pregnancy. Mom did not attempt to breast feed in labor and delivery. I helped mom with breast feeding this morning. Baby was on mom in prone position at her left breast. After several attempts we were able to get him latched and he would suck a few times. He was very sleepy and we initially had to keep latching him. We got him to maintain the latch and suck rhythmically after a few minutes. Moms right nipple is more fibrous and we were not able to get him to latch. I gave her a latch assist and we got a couple drops of colostrum that we put into the baby's mouth. Feeding Plan: Mother will keep baby skin to skin as often as possible, feed on demand, respond to feeding cues, obtain latch, listen for audible swallowing, be aware of signs of oxytocin release/ cramping,thrist,sleepyness while breastfeeding, offer both breasts,and will not limit feedings.

## 2017-09-23 NOTE — PROGRESS NOTES
0730-pt in OR, bedside report from [employer], pt has no complaints, spinal done  0840-to L&D room 8 on stretcher, baby and  at side  1050-TO MIU in stretcher, no complaints, baby and  with pt  1100-TRANSFER - OUT REPORT:    Verbal report given to TOMMY Anthony RN(name) on Jake Roa  being transferred to [facility](unit) for routine post - op       Report consisted of patients Situation, Background, Assessment and   Recommendations(SBAR). Information from the following report(s) SBAR, Intake/Output, MAR and Recent Results was reviewed with the receiving nurse. Lines:   Peripheral IV 09/23/17 Right Hand (Active)   Site Assessment Clean, dry, & intact 9/23/2017  6:34 AM   Phlebitis Assessment 0 9/23/2017  6:34 AM   Infiltration Assessment 0 9/23/2017  6:34 AM   Dressing Status Clean, dry, & intact 9/23/2017  6:34 AM   Dressing Type Tape;Transparent 9/23/2017  6:34 AM   Hub Color/Line Status Pink; Infusing 9/23/2017  6:34 AM        Opportunity for questions and clarification was provided.       Patient transported with:   Registered Nurse

## 2017-09-23 NOTE — ANESTHESIA POSTPROCEDURE EVALUATION
Post-Anesthesia Evaluation and Assessment    Patient: Paulo Bhakta MRN: [id number]  SSN: [id number]    YOB: 1990  Age: 28 y.o. Sex: female       Cardiovascular Function/Vital Signs  Visit Vitals    /66    Pulse 66    Temp 36.7 °C (98 °F)    Resp 12    Ht 5' 8\" (1.727 m)    Wt 93.9 kg (207 lb)    SpO2 99%    Breastfeeding Unknown    BMI 31.47 kg/m2       Patient is status post spinal anesthesia for Procedure(s):   SECTION. Nausea/Vomiting: None    Postoperative hydration reviewed and adequate. Pain:  Pain Scale 1: Numeric (0 - 10) (17 0410)  Pain Intensity 1: 0 (17 0912)   Managed    Neurological Status:   Neuro (WDL): Within Defined Limits (17 0846)   At baseline    Mental Status and Level of Consciousness: Arousable    Pulmonary Status:   O2 Device: Room air (17 0846)   Adequate oxygenation and airway patent    Complications related to anesthesia: None    Post-anesthesia assessment completed.  No concerns    Signed By: Nery Escobedo MD     2017

## 2017-09-23 NOTE — IP AVS SNAPSHOT
[address] 
[phone] Patient: Burnell Cowden MRN: [id number] KOW:[id number]/39/[id number] You are allergic to the following Allergen Reactions Bactrim (Sulfamethoprim) Rash Immunizations Administered for This Admission Name Date Rho(D) Immune Globulin - IM 9/24/2017 Recent Documentation Height Weight Breastfeeding? BMI OB Status Smoking Status 1.727 m 93.9 kg Unknown 31.47 kg/m2 Recent pregnancy Never Smoker Unresulted Labs Order Current Status SAMPLE TO BLOOD BANK In process Emergency Contacts Name Discharge Info Relation Home Work Mobile Gabbi DAVID Teran 106 CAREGIVER [3] Spouse [3] [phone] About your hospitalization You were admitted on:  September 23, 2017 You last received care in the:  [address] [facility] You were discharged on:  September 26, 2017 Unit phone number:  [phone] Why you were hospitalized Your primary diagnosis was:  Not on File Your diagnoses also included:  Rupture Of Membranes With Clear Amniotic Fluid Providers Seen During Your Hospitalizations Provider Role Specialty Primary office phone Niurka Metz, [address] [facility] Attending Provider Obstetrics & Gynecology [phone] Your Primary Care Physician (PCP) Primary Care Physician Office Phone Office Fax Fawn Nelson [phone] Follow-up Information Follow up With Details Comments Contact Info BESSY Bucio   [facility] [address] [place]jinder Muñoz [address] [phone] [address] Call As needed with breastfeeding [address] [facility], [address] 
[phone] Niurka Metz, [address] [place] [address] 
[phone] Current Discharge Medication List  
  
START taking these medications Dose & Instructions Dispensing Information Comments Morning Noon Evening Bedtime  
 ibuprofen 800 mg tablet Commonly known as:  MOTRIN Your last dose was: Your next dose is:    
   
   
 Dose:  800 mg Take 1 Tab by mouth every eight (8) hours as needed for Pain. Quantity:  30 Tab Refills:  1  
     
   
   
   
  
 oxyCODONE-acetaminophen 5-325 mg per tablet Commonly known as:  PERCOCET Your last dose was: Your next dose is:    
   
   
 Dose:  1 Tab Take 1 Tab by mouth every four (4) hours as needed. Max Daily Amount: 6 Tabs. Quantity:  30 Tab Refills:  0 CONTINUE these medications which have NOT CHANGED Dose & Instructions Dispensing Information Comments Morning Noon Evening Bedtime PEPCID 40 mg tablet Generic drug:  famotidine Your last dose was: Your next dose is:    
   
   
 Dose:  40 mg Take 40 mg by mouth daily. Refills:  0  
     
   
   
   
  
 PNV no.106-iron-folate no6-dha 27.5 mg iron- 1 mg Cap Your last dose was: Your next dose is: Take  by mouth. Refills:  0 STOP taking these medications   
 cyclobenzaprine 10 mg tablet Commonly known as:  FLEXERIL  
   
  
 XANAX 0.5 mg tablet Generic drug:  ALPRAZolam  
   
  
  
  
Where to Get Your Medications Information on where to get these meds will be given to you by the nurse or doctor. ! Ask your nurse or doctor about these medications  
  ibuprofen 800 mg tablet  
 oxyCODONE-acetaminophen 5-325 mg per tablet Discharge Instructions POSTPARTUM DISCHARGE INSTRUCTIONS Name:  Jake Roa YOB: 1990 Admission Diagnosis:  Maternity Rupture of membranes with clear amniotic fluid IUP @ 39.1 weeks, breech, spontaneous rupture of membranes Discharge Diagnosis:   
Problem List as of 9/26/2017  Date Reviewed: 7/11/2017 Codes Class Noted - Resolved Rupture of membranes with clear amniotic fluid ICD-10-CM: O42.019 
ICD-9-CM: 658.10  2017 - Present Back pain affecting pregnancy in third trimester ICD-10-CM: O26.893, M54.9 ICD-9-CM: 646.83, 724.5  2017 - Present Attending Physician:  Hugo Samuels [address] [place] [address], MD 
 
Delivery Type:   Section: What to Expect at [facility] Your Recovery: A  section, or , is surgery to deliver your baby through a cut, called an incision that the doctor makes in your lower belly and uterus. You may have some pain in your lower belly and need pain medicine for 1 to 2 weeks. You can expect some vaginal bleeding for several weeks. You will probably need about 6 weeks to fully recover. It is important to take it easy while the incision is healing. Avoid heavy lifting, strenuous activities, or exercises that strain the belly muscles while you are recovering. Ask a family member or friend for help with housework, cooking, and shopping. This care sheet gives you a general idea about how long it will take for you to recover. But each person recovers at a different pace. Follow the steps below to get better as quickly as possible. How can you care for yourself at home? Activity · Rest when you feel tired. Getting enough sleep will help you recover. · Try to walk each day. Start by walking a little more than you did the day before. Bit by bit, increase the amount you walk. Walking boosts blood flow and helps prevent pneumonia, constipation, and blood clots. · Avoid strenuous activities, such as bicycle riding, jogging, weightlifting, and aerobic exercise, for 6 weeks or until your doctor says it is okay. · Until your doctor says it is okay, do not lift anything heavier than your baby. · Do not do sit-ups or other exercise that strain the belly muscles for 6 weeks or until your doctor says it is okay. · Hold a pillow over your incision when you cough or take deep breaths. This will support your belly and decrease your pain. · You may shower as usual. Pat the incision dry when you are done. · You will have some vaginal bleeding. Wear sanitary pads. Do not douche or use tampons until your doctor says it is okay. · Ask your doctor when you can drive again. · You will probably need to take at least 6 weeks off work. It depends on the type of work you do and how you feel. · Wait until you are healed (about 4 to 6 weeks) before you have sexual intercourse. Your doctor will tell you when it is okay to have sex. · Talk to your doctor about birth control. You can get pregnant even before your period returns. Also, you can get pregnant while you are breast-feeding. Incision care Your skin is your body's first line of defense against germs, but an incision site leaves an easy way for germs to enter your body. To prevent infection: · Clean your hands frequently and before and after changing any touching any dressings. · If you have strips of tape on the incision, leave the tape on for a week or until it falls off. · Look at your incision closely every day for any changes. Contact your doctor if you experience any signs of infection, such as fever or increased redness at the surgical site. · Wash the area daily with warm, soapy water, and pat it dry. Don't use hydrogen peroxide or alcohol, which can slow healing. You may cover the area with a gauze bandage if it weeps or rubs against clothing. Change the bandage every day. · Keep the area clean and dry. Diet · You can eat your normal diet. If your stomach is upset, try bland, low-fat foods like plain rice, broiled chicken, toast, and yogurt. · Drink plenty of fluids (unless your doctor tells you not to). · You may notice that your bowel movements are not regular right after your surgery. This is common.  Try to avoid constipation and straining with bowel movements. You may want to take a fiber supplement every day. If you have not had a bowel movement after a couple of days, ask your doctor about taking a mild laxative. · If you are breast-feeding, do not drink any alcohol. Medicines · Take pain medicines exactly as directed. · If the doctor gave you a prescription medicine for pain, take it as prescribed. · If you are not taking a prescription pain medicine, ask your doctor if you can take an over-the-counter medicine such as acetaminophen (Tylenol), ibuprofen (Advil, Motrin), or naproxen (Aleve), for cramps. Read and follow all instructions on the label. Do not take aspirin, because it can cause more bleeding. Do not take acetaminophen (Tylenol) and other acetaminophen containing medications (i.e. Percocet) at the same time. · If you think your pain medicine is making you sick to your stomach: 
· Take your medicine after meals (unless your doctor has told you not to). · Ask your doctor for a different pain medicine. · If your doctor prescribed antibiotics, take them as directed. Do not stop taking them just because you feel better. You need to take the full course of antibiotics. Mental Health · Many women get the \"baby blues\" during the first few days after childbirth. You may lose sleep, feel irritable, and cry easily. You may feel happy one minute and sad the next. Hormone changes are one cause of these emotional changes. Also, the demands of a new baby, along with visits from relatives or other family needs, add to a mother's stress. The \"baby blues\" often peak around the fourth day. Then they ease up in less than 2 weeks. · If your moodiness or anxiety lasts for more than 2 weeks, or if you feel like life is not worth living, you may have postpartum depression. This is different for each mother. Some mothers with serious depression may worry intensely about their infant's well-being.  Others may feel distant from their child. Some mothers might even feel that they might harm their baby. A mother may have signs of paranoia, wondering if someone is watching her. · With all the changes in your life, you may not know if you are depressed. Pregnancy sometimes causes changes in how you feel that are similar to the symptoms of depression. · Symptoms of depression include: · Feeling sad or hopeless and losing interest in daily activities. These are the most common symptoms of depression. · Sleeping too much or not enough. · Feeling tired. You may feel as if you have no energy. · Eating too much or too little. · POSTPARTUM [employer] INTERNATIONAL (PSI) offers a Warm line; Chat with the Expert phone sessions; Information and Articles about Pregnancy and Postpartum Mood Disorders; Comprehensive List of Free Support Groups; Knowledgeable local coordinators who will offer support, information, and resources; Guide to Resources on Shoutitout; Calendar of events in the  mood disorders community; Latest News and Research; and [facility] [address] for [employer]. Remember - You are not alone; You are not to blame; With help, you will be well. 6-330-183-PPD(8636). WWW. POSTPARTUM. NET · Writing or talking about death, such as writing suicide notes or talking about guns, knives, or pills. Keep the numbers for these national suicide hotlines: 8-732-152-TALK (9-392.551.5021) and 2-878-MXBBXVP (5-401.315.9505). If you or someone you know talks about suicide or feeling hopeless, get help right away. Other instructions · If you breast-feed your baby, you may be more comfortable while you are healing if you place the baby so that he or she is not resting on your belly. Try tucking your baby under your arm, with his or her body along the side you will be feeding on. Support your baby's upper body with your arm.  With that hand you can control your baby's head to bring his or her mouth to your breast. This is sometimes called the football hold. Follow-up care is a key part of your treatment and safety. Be sure to make and go to all appointments, and call your doctor if you are having problems. It's also a good idea to know your test results and keep a list of the medicines you take. When should you call for help? Call 911 anytime you think you may need emergency care. For example, call if: 
· You are thinking of hurting yourself, your baby, or anyone else. · You passed out (lost consciousness). · You have symptoms of a blood clot in your lung (called a pulmonary embolism). These may include: 
· Sudden chest pain. · Trouble breathing. · Coughing up blood. Call your doctor now or seek immediate medical care if: 
 
· You have severe vaginal bleeding. · You are soaking through a pad each hour for 2 or more hours. · Your vaginal bleeding seems to be getting heavier or is still bright red 4 days after delivery. · You are dizzy or lightheaded, or you feel like you may faint. · You are vomiting or cannot keep fluids down. · You have a fever. · You have new or more belly pain. · You have loose stitches, or your incision comes open. · You have symptoms of infection, such as: 
· Increased pain, swelling, warmth, or redness. · Red streaks leading from the incision. · Pus draining from the incision. · A fever · You pass tissue (not just blood). · Your vaginal discharge smells bad. · Your belly feels tender or full and hard. · Your breasts are continuously painful or red. · You feel sad, anxious, or hopeless for more than a few days. · You have sudden, severe pain in your belly. · You have symptoms of a blood clot in your leg (called a deep vein thrombosis), such as: 
· Pain in your calf, back of the knee, thigh, or groin. · Redness and swelling in your leg or groin. · You have symptoms of preeclampsia, such as: 
· Sudden swelling of your face, hands, or feet. · New vision problems (such as dimness or blurring). · A severe headache. · Your blood pressure is higher than it should be or rises suddenly. · You have new nausea or vomiting. Watch closely for changes in your health, and be sure to contact your doctor if you have any problems. Additional Information:  Preventing Infection at Home We care about preventing infection and avoiding the spread of germs - not only when you are in the hospital but also when you return home. When you return home from the hospital, it's important to take the following steps to help prevent infection and avoid spreading germs that could infect you and others. Ask everyone in your home to follow these guidelines, too. Clean Your Hands · Clean your hands whenever your hands are visibly dirty, before you eat, before or after touching your mouth, nose or eyes, and before preparing food. Clean them after contact with body fluids, using the restroom, touching animals or changing diapers. · When washing hands, wet them with warm water and work up a lather. Rub hands for at least 15 seconds, then rinse them and pat them dry with a clean towel or paper towel. · When using hand sanitizers, it should take about 15 seconds to rub your hands dry. If not, you probably didn't apply enough . Cover Your Sneeze or Cough Germs are released into the air whenever you sneeze or cough. To prevent the spread of infection: · Turn away from other people before coughing or sneezing. · Cover your mouth or nose with a tissue when you cough or sneeze. Put the tissue in the trash. · If you don't have a tissue, cough or sneeze into your upper sleeve, not your hands. · Always clean your hands after coughing or sneezing. Care for Wounds Your skin is your body's first line of defense against germs, but an open wound leaves an easy way for germs to enter your body. To prevent infection: · Clean your hands before and after changing wound dressings, and wear gloves to change dressings if recommended by your doctor. · Take special care with IV lines or other devices inserted into the body. If you must touch them, clean your hands first. 
· Follow any specific instructions from your doctor to care for your wounds. Contact your doctor if you experience any signs of infection, such as fever or increased redness at the surgical or wound site. Keep a [place] [address] · Clean or wipe commonly touched hard surfaces like door handles, sinks, tabletops, phones and TV remotes. · Use products labeled \"disinfectant\" to kill harmful bacteria and viruses. · Use a clean cloth or paper towel to clean and dry surfaces. Wiping surfaces with a dirty dishcloth, sponge or towel will only spread germs. · Never share toothbrushes, tony, drinking glasses, utensils, razor blades, face cloths or bath towels to avoid spreading germs. · Be sure that the linens that you sleep on are clean. · Keep pets away from wounds and wash your hands after touching pets, their toys or bedding. We care about you and your health. Remember, preventing infections is a  
team effort between you, your family, friends and health care providers. These are general instructions for a healthy lifestyle: No smoking/ No tobacco products/ Avoid exposure to second hand smoke Surgeon General's Warning:  Quitting smoking now greatly reduces serious risk to your health. Obesity, smoking, and sedentary lifestyle greatly increases your risk for illness A healthy diet, regular physical exercise & weight monitoring are important for maintaining a healthy lifestyle Recognize signs and symptoms of STROKE: 
 
F-face looks uneven A-arms unable to move or move unevenly S-speech slurred or non-existent T-time-call 911 as soon as signs and symptoms begin - DO NOT go  
    back to bed or wait to see if you get better - TIME IS BRAIN. I have had the opportunity to make my options or choices for discharge. I have received and understand these instructions. Discharge Orders None [employer] Announcement We are excited to announce that we are making your provider's discharge notes available to you in [employer]. You will see these notes when they are completed and signed by the physician that discharged you from your recent hospital stay. If you have any questions or concerns about any information you see in [employer], please call the Health Information Department where you were seen or reach out to your Primary Care Provider for more information about your plan of care. Introducing [facility]! Rigoberto Pool introduces Together Mobile patient portal. Now you can access parts of your medical record, email your doctor's office, and request medication refills online. 1. In your internet browser, go to [email or web address] 2. Click on the First Time User? Click Here link in the Sign In box. You will see the New Member Sign Up page. 3. Enter your [employer] Access Code exactly as it appears below. You will not need to use this code after youve completed the sign-up process. If you do not sign up before the expiration date, you must request a new code. · [employer] Access Code: [email or web address] Expires: 10/9/2017  2:51 PM 
 
4. Enter the last four digits of your Social Security Number (xxxx) and Date of Birth (mm/dd/yyyy) as indicated and click Submit. You will be taken to the next sign-up page. 5. Create a [employer] ID. This will be your [employer] login ID and cannot be changed, so think of one that is secure and easy to remember. 6. Create a [employer] password. You can change your password at any time. 7. Enter your Password Reset Question and Answer. This can be used at a later time if you forget your password. 8. Enter your e-mail address.  You will receive e-mail notification when new information is available in [employer]. 9. Click Sign Up. You can now view and download portions of your medical record. 10. Click the Download Summary menu link to download a portable copy of your medical information. If you have questions, please visit the Frequently Asked Questions section of the [employer] website. Remember, [employer] is NOT to be used for urgent needs. For medical emergencies, dial 911. Now available from your iPhone and Android! General Information Please provide this summary of care documentation to your next provider. Patient Signature:  ____________________________________________________________ Date:  ____________________________________________________________  
  
Esperanza Le Provider Signature:  ____________________________________________________________ Date:  ____________________________________________________________

## 2017-09-24 LAB
ABO + RH BLD: NORMAL
BASOPHILS # BLD: 0 K/UL (ref 0–0.1)
BASOPHILS NFR BLD: 0 % (ref 0–1)
BLD PROD TYP BPU: NORMAL
BLOOD GROUP ANTIBODIES SERPL: NORMAL
BLOOD GROUP ANTIBODIES SERPL: NORMAL
BPU ID: NORMAL
CROSSMATCH RESULT,%XM: NORMAL
EOSINOPHIL # BLD: 0 K/UL (ref 0–0.4)
EOSINOPHIL NFR BLD: 0 % (ref 0–7)
ERYTHROCYTE [DISTWIDTH] IN BLOOD BY AUTOMATED COUNT: 14 % (ref 11.5–14.5)
HCT VFR BLD AUTO: 31.2 % (ref 35–47)
HGB BLD-MCNC: 10.6 G/DL (ref 11.5–16)
LYMPHOCYTES # BLD: 1.8 K/UL (ref 0.8–3.5)
LYMPHOCYTES NFR BLD: 14 % (ref 12–49)
MCH RBC QN AUTO: 31.5 PG (ref 26–34)
MCHC RBC AUTO-ENTMCNC: 34 G/DL (ref 30–36.5)
MCV RBC AUTO: 92.6 FL (ref 80–99)
MONOCYTES # BLD: 1 K/UL (ref 0–1)
MONOCYTES NFR BLD: 8 % (ref 5–13)
NEUTS SEG # BLD: 9.8 K/UL (ref 1.8–8)
NEUTS SEG NFR BLD: 78 % (ref 32–75)
PLATELET # BLD AUTO: 211 K/UL (ref 150–400)
RBC # BLD AUTO: 3.37 M/UL (ref 3.8–5.2)
SPECIMEN EXP DATE BLD: NORMAL
STATUS OF UNIT,%ST: NORMAL
UNIT DIVISION, %UDIV: 0
WBC # BLD AUTO: 12.5 K/UL (ref 3.6–11)

## 2017-09-24 PROCEDURE — 36415 COLL VENOUS BLD VENIPUNCTURE: CPT | Performed by: OBSTETRICS & GYNECOLOGY

## 2017-09-24 PROCEDURE — 74011250636 HC RX REV CODE- 250/636: Performed by: ANESTHESIOLOGY

## 2017-09-24 PROCEDURE — 74011250637 HC RX REV CODE- 250/637: Performed by: OBSTETRICS & GYNECOLOGY

## 2017-09-24 PROCEDURE — 74011250636 HC RX REV CODE- 250/636: Performed by: OBSTETRICS & GYNECOLOGY

## 2017-09-24 PROCEDURE — 86900 BLOOD TYPING SEROLOGIC ABO: CPT | Performed by: OBSTETRICS & GYNECOLOGY

## 2017-09-24 PROCEDURE — 85025 COMPLETE CBC W/AUTO DIFF WBC: CPT | Performed by: OBSTETRICS & GYNECOLOGY

## 2017-09-24 PROCEDURE — 3E0234Z INTRODUCTION OF SERUM, TOXOID AND VACCINE INTO MUSCLE, PERCUTANEOUS APPROACH: ICD-10-PCS | Performed by: OBSTETRICS & GYNECOLOGY

## 2017-09-24 PROCEDURE — 85461 HEMOGLOBIN FETAL: CPT | Performed by: OBSTETRICS & GYNECOLOGY

## 2017-09-24 PROCEDURE — 65410000002 HC RM PRIVATE OB

## 2017-09-24 RX ORDER — IBUPROFEN 400 MG/1
800 TABLET ORAL EVERY 8 HOURS
Status: DISCONTINUED | OUTPATIENT
Start: 2017-09-24 | End: 2017-09-26 | Stop reason: HOSPADM

## 2017-09-24 RX ORDER — SODIUM CHLORIDE 0.9 % (FLUSH) 0.9 %
SYRINGE (ML) INJECTION
Status: COMPLETED
Start: 2017-09-24 | End: 2017-09-24

## 2017-09-24 RX ORDER — OXYCODONE AND ACETAMINOPHEN 5; 325 MG/1; MG/1
1-2 TABLET ORAL
Status: DISCONTINUED | OUTPATIENT
Start: 2017-09-24 | End: 2017-09-26 | Stop reason: HOSPADM

## 2017-09-24 RX ADMIN — IBUPROFEN 800 MG: 400 TABLET, FILM COATED ORAL at 21:10

## 2017-09-24 RX ADMIN — HUMAN RHO(D) IMMUNE GLOBULIN 0.3 MG: 300 INJECTION, SOLUTION INTRAMUSCULAR at 17:42

## 2017-09-24 RX ADMIN — IBUPROFEN 800 MG: 400 TABLET, FILM COATED ORAL at 13:01

## 2017-09-24 RX ADMIN — Medication 10 ML: at 06:39

## 2017-09-24 RX ADMIN — OXYCODONE HYDROCHLORIDE AND ACETAMINOPHEN 1 TABLET: 5; 325 TABLET ORAL at 17:39

## 2017-09-24 RX ADMIN — KETOROLAC TROMETHAMINE 30 MG: 30 INJECTION, SOLUTION INTRAMUSCULAR at 06:39

## 2017-09-24 NOTE — PROGRESS NOTES
Post operative day number one after surgery with Duramorph for post op pain. continue current Duramorph order protocol.

## 2017-09-24 NOTE — PROGRESS NOTES
Post-Operative  Day 1    [address] Skylar Garcia for the patient's :  Varghese hopkins [[id number]]   , Low Transverse   Patient doing well without unusual complaints. Tolerating regular diet, + flatus    Vitals:  Visit Vitals    BP 93/58 (BP 1 Location: Left arm, BP Patient Position: At rest)  Comment: encouraged oral intake    Pulse 86    Temp 98.3 °F (36.8 °C)    Resp 16    Ht 5' 8\" (1.727 m)    Wt 93.9 kg (207 lb)    SpO2 97%    Breastfeeding Unknown    BMI 31.47 kg/m2     Temp (24hrs), Av.1 °F (36.7 °C), Min:97.4 °F (36.3 °C), Max:98.9 °F (37.2 °C)      Last 24hr Input/Output:    Intake/Output Summary (Last 24 hours) at 17 09  Last data filed at 17 1930   Gross per 24 hour   Intake                0 ml   Output              800 ml   Net             -800 ml          Exam:       Patient without distress. Abdomen:soft, expected tenderness, fundus firm, wound dressing dry     Lower extremities are nontender without edema. No cords    Labs:   Lab Results   Component Value Date/Time    WBC 16.9 2017 06:22 AM    HGB 15.3 2017 06:22 AM    HCT 43.9 2017 06:22 AM    PLATELET 960  06:22 AM       No results found for this or any previous visit (from the past 24 hour(s)). Assessment: Post-Op day 1, stable  Aneg./RI. BBT A+    Plan:   1. Routine post-operative care   2. Needs Rhogam.  Consented for circ but peds has requested that it be delayed til tomorrow due to feeding issues.

## 2017-09-24 NOTE — LACTATION NOTE
This note was copied from a baby's chart. Mom states the baby was fussy during the night. She was having difficulty getting him to latch. Moms nipples are everted but baby has not been able to get on the nipple deep enough to maintain the latch. She has been able to express drops of colostrum to give to the baby. I helped mom with breast feeding this morning. I got her a nipple shield and then we were able to get baby to maintain a latch. He sucked rhythmically for about 11 minutes. We took the nipple shield off and then put the baby back to the breast. We latched him and he sucked for another 8 minutes. I recommended that mom continue to use the nipple shield to nurse. She will nurse at least every 3 hours. I set mom up with the electric breast pump. She will pump after nursing and will give the baby any colostrum she collects.

## 2017-09-25 LAB
ABO + RH BLD: NORMAL
BLD PROD TYP BPU: NORMAL
BLD PROD TYP BPU: NORMAL
BPU ID: NORMAL
BPU ID: NORMAL
FETAL SCREEN,FMHS: NORMAL
STATUS OF UNIT,%ST: NORMAL
STATUS OF UNIT,%ST: NORMAL
UNIT DIVISION, %UDIV: 0
UNIT DIVISION, %UDIV: 0
WEAK D AG RBC QL: NORMAL

## 2017-09-25 PROCEDURE — 65410000002 HC RM PRIVATE OB

## 2017-09-25 PROCEDURE — 74011250637 HC RX REV CODE- 250/637: Performed by: OBSTETRICS & GYNECOLOGY

## 2017-09-25 RX ADMIN — IBUPROFEN 800 MG: 400 TABLET, FILM COATED ORAL at 13:19

## 2017-09-25 RX ADMIN — IBUPROFEN 800 MG: 400 TABLET, FILM COATED ORAL at 20:48

## 2017-09-25 RX ADMIN — OXYCODONE HYDROCHLORIDE AND ACETAMINOPHEN 1 TABLET: 5; 325 TABLET ORAL at 14:47

## 2017-09-25 RX ADMIN — OXYCODONE HYDROCHLORIDE AND ACETAMINOPHEN 1 TABLET: 5; 325 TABLET ORAL at 20:49

## 2017-09-25 RX ADMIN — IBUPROFEN 800 MG: 400 TABLET, FILM COATED ORAL at 04:59

## 2017-09-25 NOTE — PROGRESS NOTES
Progress Note                               Patient: Micheal Grigsby MRN: [id number]  SSN: [id number]    YOB: 1990  Age: 28 y.o. Sex: female      2 Days Post-Op     Subjective:     Patient doing well postpartum without significant complaints. Voiding without difficulty. Passing flatus. Patient reports normal lochia. . Breastfeeding: Yes Denies fever, chills, SOB, chest pain. Objective:     Patient Vitals for the past 18 hrs:   Temp Pulse Resp BP   17 0500 98.5 °F (36.9 °C) 90 16 105/68   17 2105 98.6 °F (37 °C) (!) 108 16 128/75   17 1730 97.7 °F (36.5 °C) 98 14 114/66       Temp (24hrs), Av.2 °F (36.8 °C), Min:97.7 °F (36.5 °C), Max:98.6 °F (37 °C)      Physical Exam:    General:   Patient without distress. Abdomen: Soft, fundus firm at level of umbilicus, nontender   Incision: Incision dressing dry, and intact. Small area of drainage marked but has not spread   Lower Extremities: Negative for swelling, cords, or tenderness          Lab/Data Review: All lab results for the last 24 hours reviewed. Assessment and Plan:     Patient appears to be having an uncomplicated post- course. Continue routine postoperative care and maternal education  Boy- desires circumcision however will be having a frenulectomy today. Asked to delay procedure per pediatrician. Signed By: Rainer Armendariz  [facility] [address], [place]     2017

## 2017-09-25 NOTE — LACTATION NOTE
This note was copied from a baby's chart.     Couplet Interdisciplinary Rounds     MATERNAL    Daily Goal:     Influenza screening completed: YES   Tdap screening completed: YES   Rhogam Given:NO  MMR Given:N/A    VTE Prophylaxis: Not indicated, per Provider order    EPDS:            Patient Name: OSMAN Fay Diagnosis: Ririe  Single liveborn, born in hospital, delivered by  delivery   Date of Admission: 2017 LOS: 2  Gestational Age: Gestational Age: 39w1d       Daily Goal:     Birth Weight: 3.97 kg Current Weight: Weight: 3.665 kg (   8-1.3 lbs)  % of Weight Change: -8%    Feeding:   Metabolic Screen: NO    Hepatitis B:  YES    Discharge Bili:  NO  Car Seat Trial, if needed:  N/A      Patient/Family Teaching Needs:     Days before discharge: One day until discharge    In Attendance:  Nursing and Physician

## 2017-09-25 NOTE — LACTATION NOTE
This note was copied from a baby's chart. Mom states she has been using the nipple shield to get the baby to latch. She says he has some strong sucks but he also has periods when she feels his suck is not that strong. Baby had a frenulectomy this morning and he is able to now extend his tongue out to his lips. Mom has been pumping after nursing attempts and she is only getting drops of colostrum. She has been putting the colostrum into the baby's mouth. I helped mom with breast feeding this morning after the frenulectomy. We were not able to get him latched directly to the breast. He had a good latch with the nipple shield and mom stated she felt the sucks were stronger. He nursed for 7 minutes with the shield. Mom will feed the baby at least every 3 hours and she will continue to pump after nursing.

## 2017-09-25 NOTE — PROGRESS NOTES
Problem:  Delivery: Postpartum Day 3  Goal: Consults, if ordered  Outcome: Progressing Towards Goal  Lactation following-- infant is s/p frenulectomy. Goal: Medications  Outcome: Progressing Towards Goal  Patient has received Tdap, Rhogam, flu vaccines during pregnancy.

## 2017-09-25 NOTE — LACTATION NOTE
Bedside shift change report given to Abdiel Wayne (oncoming nurse) by Collette Schaefer (offgoing nurse). Report included the following information SBAR, Kardex, Intake/Output, MAR and Recent Results.

## 2017-09-26 VITALS
DIASTOLIC BLOOD PRESSURE: 68 MMHG | HEIGHT: 68 IN | SYSTOLIC BLOOD PRESSURE: 110 MMHG | HEART RATE: 80 BPM | WEIGHT: 207 LBS | RESPIRATION RATE: 16 BRPM | BODY MASS INDEX: 31.37 KG/M2 | TEMPERATURE: 98.5 F | OXYGEN SATURATION: 97 %

## 2017-09-26 PROCEDURE — 74011250637 HC RX REV CODE- 250/637: Performed by: OBSTETRICS & GYNECOLOGY

## 2017-09-26 RX ORDER — OXYCODONE AND ACETAMINOPHEN 5; 325 MG/1; MG/1
1 TABLET ORAL
Qty: 30 TAB | Refills: 0 | Status: SHIPPED | OUTPATIENT
Start: 2017-09-26 | End: 2019-06-11

## 2017-09-26 RX ORDER — IBUPROFEN 800 MG/1
800 TABLET ORAL
Qty: 30 TAB | Refills: 1 | Status: SHIPPED | OUTPATIENT
Start: 2017-09-26 | End: 2019-06-11

## 2017-09-26 RX ADMIN — IBUPROFEN 800 MG: 400 TABLET, FILM COATED ORAL at 05:16

## 2017-09-26 NOTE — PROGRESS NOTES
Progress Note                               Patient: Acacia Freitas MRN: [id number]  SSN: [id number]    YOB: 1990  Age: 28 y.o. Sex: female      3 Days Post-Op     Subjective:     Patient doing well postpartum without significant complaints. Voiding without difficulty. + Flatus. +Bowel movement. Patient reports normal lochia. . Breastfeeding: Yes Denies N/V/SOB/CP/visual changes/headache. Objective:     Patient Vitals for the past 18 hrs:   Temp Pulse Resp BP   17 0020 98.3 °F (36.8 °C) 86 16 119/66   17 1617 98.7 °F (37.1 °C) 96 16 116/72       Temp (24hrs), Av.4 °F (36.9 °C), Min:98.3 °F (36.8 °C), Max:98.7 °F (37.1 °C)      Physical Exam:    General:   Patient without distress. Abdomen: Soft, fundus firm at level of umbilicus, nontender   Incision: Dressing dry and intact   Lower Extremities: Negative for swelling, cords, or tenderness          Lab/Data Review: All lab results for the last 24 hours reviewed. Assessment and Plan:     POD 3  Patient appears to be having an uncomplicated post- course. Continue routine postoperative care and maternal education. and Will discharge home today. Boy- s/p circumcision  Pt to followup in office in 6 weeks or PRN    Signed By: Adi Abrams MD     2017

## 2017-09-26 NOTE — DISCHARGE SUMMARY
Obstetrical Discharge Summary     Name: Josi Pratt MRN: [id number]  SSN: [id number]    YOB: 1990  Age: 28 y.o. Sex: female      Admit Date: 2017    Discharge Date: 2017     Admitting Physician: Charley Okeefe MD     Attending Physician:  John Paul Polanco  [address], MD     * Admission Diagnoses: Maternity  Rupture of membranes with clear amniotic fluid  IUP @ 39.1 weeks, breech, spontaneous rupture of membranes    * Discharge Diagnoses:   Information for the patient's :  Edison Monae [[id number]]   Delivery of a 3.97 kg male infant via , Low Transverse on 2017 at 7:51 AM  by . Apgars were 8 and 8.        Additional Diagnoses:   Hospital Problems as of 2017  Date Reviewed: 2017          Codes Class Noted - Resolved POA    Rupture of membranes with clear amniotic fluid ICD-10-CM: O42.019  ICD-9-CM: 658.10  2017 - Present Unknown             Lab Results   Component Value Date/Time    ABO/Rh(D) A NEGATIVE 2017 06:26 AM    Rubella, External immune 2017    GrBStrep, External positive 2017    ABO,Rh A  Negative 2017      Immunization History   Administered Date(s) Administered    Rho(D) Immune Globulin - IM 2017       * Procedures:  section  Procedure(s):   SECTION      Topping  Depression Scale  I have been able to laugh and see the funny side of things: As much as I always could  I have looked forward with enjoyment to things: As much as I ever did  I have blamed myself unnecessarily when things went wrong: Yes, some of the time  I have been anxious or worried for no good reason: Yes, very often  I have felt scared or panicky for no very good reason: Yes, sometimes  Things have been getting on top of me: No, most of the time I have coped quite well  I have been so unhappy that I have had difficulty sleeping: No, not at all  I have felt sad or miserable: No, not at all  I have been so unhappy that I have been crying: Only occasionally  The thought of harming myself has occurred to me: Never  Total Score: 9    * Discharge Condition: stable    * Hospital Course: Normal hospital course following the delivery. * Disposition: Home    Discharge Medications:   Current Discharge Medication List      START taking these medications    Details   ibuprofen (MOTRIN) 800 mg tablet Take 1 Tab by mouth every eight (8) hours as needed for Pain. Qty: 30 Tab, Refills: 1      oxyCODONE-acetaminophen (PERCOCET) 5-325 mg per tablet Take 1 Tab by mouth every four (4) hours as needed. Max Daily Amount: 6 Tabs. Qty: 30 Tab, Refills: 0         CONTINUE these medications which have NOT CHANGED    Details   PNV no.106-iron-folate no6-dha 27.5 mg iron- 1 mg cap Take  by mouth. famotidine (PEPCID) 40 mg tablet Take 40 mg by mouth daily. STOP taking these medications       cyclobenzaprine (FLEXERIL) 10 mg tablet Comments:   Reason for Stopping:         ALPRAZolam (XANAX) 0.5 mg tablet Comments:   Reason for Stopping:               * Follow-up Care/Patient Instructions: Activity: No sex for 6 weeks and No driving while on analgesics  Diet: Regular Diet  Wound Care: Keep wound clean and dry and As directed    Follow-up Information     Follow up With Details Comments [facility], [id number] [place] [address] Way  [phone]             Signed By:  Yesi Johnson.  Helen Olmstead MD     September 26, 2017

## 2017-09-26 NOTE — DISCHARGE INSTRUCTIONS
POSTPARTUM DISCHARGE INSTRUCTIONS       Name:  Fidencio Benjamin  YOB: 1990  Admission Diagnosis:  Maternity  Rupture of membranes with clear amniotic fluid  IUP @ 39.1 weeks, breech, spontaneous rupture of membranes     Discharge Diagnosis:    Problem List as of 2017  Date Reviewed: 2017          Codes Class Noted - Resolved    Rupture of membranes with clear amniotic fluid ICD-10-CM: O42.019  ICD-9-CM: 658.10  2017 - Present        Back pain affecting pregnancy in third trimester ICD-10-CM: O26.893, M54.9  ICD-9-CM: 646.83, 724.5  2017 - Present            Attending Physician:  Jessie Means. Dyanna Homans, MD    Delivery Type:   Section: What to Expect at Home    Your Recovery:  A  section, or , is surgery to deliver your baby through a cut, called an incision that the doctor makes in your lower belly and uterus. You may have some pain in your lower belly and need pain medicine for 1 to 2 weeks. You can expect some vaginal bleeding for several weeks. You will probably need about 6 weeks to fully recover. It is important to take it easy while the incision is healing. Avoid heavy lifting, strenuous activities, or exercises that strain the belly muscles while you are recovering. Ask a family member or friend for help with housework, cooking, and shopping. This care sheet gives you a general idea about how long it will take for you to recover. But each person recovers at a different pace. Follow the steps below to get better as quickly as possible. How can you care for yourself at home? Activity  · Rest when you feel tired. Getting enough sleep will help you recover. · Try to walk each day. Start by walking a little more than you did the day before. Bit by bit, increase the amount you walk. Walking boosts blood flow and helps prevent pneumonia, constipation, and blood clots.   · Avoid strenuous activities, such as bicycle riding, jogging, weightlifting, and aerobic exercise, for 6 weeks or until your doctor says it is okay. · Until your doctor says it is okay, do not lift anything heavier than your baby. · Do not do sit-ups or other exercise that strain the belly muscles for 6 weeks or until your doctor says it is okay. · Hold a pillow over your incision when you cough or take deep breaths. This will support your belly and decrease your pain. · You may shower as usual. Pat the incision dry when you are done. · You will have some vaginal bleeding. Wear sanitary pads. Do not douche or use tampons until your doctor says it is okay. · Ask your doctor when you can drive again. · You will probably need to take at least 6 weeks off work. It depends on the type of work you do and how you feel. · Wait until you are healed (about 4 to 6 weeks) before you have sexual intercourse. Your doctor will tell you when it is okay to have sex. · Talk to your doctor about birth control. You can get pregnant even before your period returns. Also, you can get pregnant while you are breast-feeding. Incision care  Your skin is your body's first line of defense against germs, but an incision site leaves an easy way for germs to enter your body. To prevent infection:  · Clean your hands frequently and before and after changing any touching any dressings. · If you have strips of tape on the incision, leave the tape on for a week or until it falls off. · Look at your incision closely every day for any changes. Contact your doctor if you experience any signs of infection, such as fever or increased redness at the surgical site. · Wash the area daily with warm, soapy water, and pat it dry. Don't use hydrogen peroxide or alcohol, which can slow healing. You may cover the area with a gauze bandage if it weeps or rubs against clothing. Change the bandage every day. · Keep the area clean and dry. Diet  · You can eat your normal diet.  If your stomach is upset, try bland, low-fat foods like plain rice, broiled chicken, toast, and yogurt. · Drink plenty of fluids (unless your doctor tells you not to). · You may notice that your bowel movements are not regular right after your surgery. This is common. Try to avoid constipation and straining with bowel movements. You may want to take a fiber supplement every day. If you have not had a bowel movement after a couple of days, ask your doctor about taking a mild laxative. · If you are breast-feeding, do not drink any alcohol. Medicines  · Take pain medicines exactly as directed. · If the doctor gave you a prescription medicine for pain, take it as prescribed. · If you are not taking a prescription pain medicine, ask your doctor if you can take an over-the-counter medicine such as acetaminophen (Tylenol), ibuprofen (Advil, Motrin), or naproxen (Aleve), for cramps. Read and follow all instructions on the label. Do not take aspirin, because it can cause more bleeding. Do not take acetaminophen (Tylenol) and other acetaminophen containing medications (i.e. Percocet) at the same time. · If you think your pain medicine is making you sick to your stomach:  · Take your medicine after meals (unless your doctor has told you not to). · Ask your doctor for a different pain medicine. · If your doctor prescribed antibiotics, take them as directed. Do not stop taking them just because you feel better. You need to take the full course of antibiotics. Mental Health  · Many women get the \"baby blues\" during the first few days after childbirth. You may lose sleep, feel irritable, and cry easily. You may feel happy one minute and sad the next. Hormone changes are one cause of these emotional changes. Also, the demands of a new baby, along with visits from relatives or other family needs, add to a mother's stress. The \"baby blues\" often peak around the fourth day. Then they ease up in less than 2 weeks.   · If your moodiness or anxiety lasts for more than 2 weeks, or if you feel like life is not worth living, you may have postpartum depression. This is different for each mother. Some mothers with serious depression may worry intensely about their infant's well-being. Others may feel distant from their child. Some mothers might even feel that they might harm their baby. A mother may have signs of paranoia, wondering if someone is watching her. · With all the changes in your life, you may not know if you are depressed. Pregnancy sometimes causes changes in how you feel that are similar to the symptoms of depression. · Symptoms of depression include:  · Feeling sad or hopeless and losing interest in daily activities. These are the most common symptoms of depression. · Sleeping too much or not enough. · Feeling tired. You may feel as if you have no energy. · Eating too much or too little. · [employer] ([employer]) offers a Warm line; Chat with the Expert phone sessions; Information and Articles about Pregnancy and Postpartum Mood Disorders; Comprehensive List of Free Support Groups; Knowledgeable local coordinators who will offer support, information, and resources; Guide to Resources on [employer]; Calendar of events in the  mood disorders community; Latest News and Research; and [facility] [address] for [employer]. Remember - You are not alone; You are not to blame; With help, you will be well. 4-058-386-PPD(9751). WWW. POSTPARTUM. NET   · Writing or talking about death, such as writing suicide notes or talking about guns, knives, or pills. Keep the numbers for these national suicide hotlines: 1-654-218-TALK (5-687.324.2951) and 4-780-DSKHYIM (3-713.320.6498). If you or someone you know talks about suicide or feeling hopeless, get help right away. Other instructions  · If you breast-feed your baby, you may be more comfortable while you are healing if you place the baby so that he or she is not resting on your belly.  Try tucking your baby under your arm, with his or her body along the side you will be feeding on. Support your baby's upper body with your arm. With that hand you can control your baby's head to bring his or her mouth to your breast. This is sometimes called the football hold. Follow-up care is a key part of your treatment and safety. Be sure to make and go to all appointments, and call your doctor if you are having problems. It's also a good idea to know your test results and keep a list of the medicines you take. When should you call for help? Call 911 anytime you think you may need emergency care. For example, call if:  · You are thinking of hurting yourself, your baby, or anyone else. · You passed out (lost consciousness). · You have symptoms of a blood clot in your lung (called a pulmonary embolism). These may include:  · Sudden chest pain. · Trouble breathing. · Coughing up blood. Call your doctor now or seek immediate medical care if:    · You have severe vaginal bleeding. · You are soaking through a pad each hour for 2 or more hours. · Your vaginal bleeding seems to be getting heavier or is still bright red 4 days after delivery. · You are dizzy or lightheaded, or you feel like you may faint. · You are vomiting or cannot keep fluids down. · You have a fever. · You have new or more belly pain. · You have loose stitches, or your incision comes open. · You have symptoms of infection, such as:  · Increased pain, swelling, warmth, or redness. · Red streaks leading from the incision. · Pus draining from the incision. · A fever  · You pass tissue (not just blood). · Your vaginal discharge smells bad. · Your belly feels tender or full and hard. · Your breasts are continuously painful or red. · You feel sad, anxious, or hopeless for more than a few days. · You have sudden, severe pain in your belly.   · You have symptoms of a blood clot in your leg (called a deep vein thrombosis), such as:  · Pain in your calf, back of the knee, thigh, or groin. · Redness and swelling in your leg or groin. · You have symptoms of preeclampsia, such as:  · Sudden swelling of your face, hands, or feet. · New vision problems (such as dimness or blurring). · A severe headache. · Your blood pressure is higher than it should be or rises suddenly. · You have new nausea or vomiting. Watch closely for changes in your health, and be sure to contact your doctor if you have any problems. Additional Information:  Preventing Infection at Home    We care about preventing infection and avoiding the spread of germs - not only when you are in the hospital but also when you return home. When you return home from the hospital, it's important to take the following steps to help prevent infection and avoid spreading germs that could infect you and others. Ask everyone in your home to follow these guidelines, too. Clean Your Hands  · Clean your hands whenever your hands are visibly dirty, before you eat, before or after touching your mouth, nose or eyes, and before preparing food. Clean them after contact with body fluids, using the restroom, touching animals or changing diapers. · When washing hands, wet them with warm water and work up a lather. Rub hands for at least 15 seconds, then rinse them and pat them dry with a clean towel or paper towel. · When using hand sanitizers, it should take about 15 seconds to rub your hands dry. If not, you probably didn't apply enough . Cover Your Sneeze or Cough  Germs are released into the air whenever you sneeze or cough. To prevent the spread of infection:  · Turn away from other people before coughing or sneezing. · Cover your mouth or nose with a tissue when you cough or sneeze. Put the tissue in the trash. · If you don't have a tissue, cough or sneeze into your upper sleeve, not your hands. · Always clean your hands after coughing or sneezing.     Care for Wounds  Your skin is your body's first line of defense against germs, but an open wound leaves an easy way for germs to enter your body. To prevent infection:  · Clean your hands before and after changing wound dressings, and wear gloves to change dressings if recommended by your doctor. · Take special care with IV lines or other devices inserted into the body. If you must touch them, clean your hands first.  · Follow any specific instructions from your doctor to care for your wounds. Contact your doctor if you experience any signs of infection, such as fever or increased redness at the surgical or wound site. Keep a Clean Home  · Clean or wipe commonly touched hard surfaces like door handles, sinks, tabletops, phones and TV remotes. · Use products labeled \"disinfectant\" to kill harmful bacteria and viruses. · Use a clean cloth or paper towel to clean and dry surfaces. Wiping surfaces with a dirty dishcloth, sponge or towel will only spread germs. · Never share toothbrushes, tony, drinking glasses, utensils, razor blades, face cloths or bath towels to avoid spreading germs. · Be sure that the linens that you sleep on are clean. · Keep pets away from wounds and wash your hands after touching pets, their toys or bedding. We care about you and your health. Remember, preventing infections is a   team effort between you, your family, friends and health care providers. These are general instructions for a healthy lifestyle:    No smoking/ No tobacco products/ Avoid exposure to second hand smoke    Surgeon General's Warning:  Quitting smoking now greatly reduces serious risk to your health.     Obesity, smoking, and sedentary lifestyle greatly increases your risk for illness    A healthy diet, regular physical exercise & weight monitoring are important for maintaining a healthy lifestyle    Recognize signs and symptoms of STROKE:    F-face looks uneven    A-arms unable to move or move unevenly    S-speech slurred or non-existent    T-time-call 911 as soon as signs and symptoms begin - DO NOT go       back to bed or wait to see if you get better - TIME IS BRAIN. I have had the opportunity to make my options or choices for discharge. I have received and understand these instructions.

## 2017-09-26 NOTE — LACTATION NOTE
This note was copied from a baby's chart. Baby nursing well and has improved throughout post partum stay, deep latch maintained, mother is comfortable, milk is in transition, baby feeding vigorously with rhythmic suck, swallow, breathe pattern, with audible swallowing, and evident milk transfer, both breasts offerd, baby is asleep following feeding. Baby is feeding on demand, voiding and stools present as appropriate over the last 24 hours. Breasts may become engorged when milk \"comes in\". How milk is made / normal phases of milk production, supply and demand discussed. Taught care of engorged breasts - frequent breastfeeding encouraged, warm compresses and breast massage ac. Then nurse the baby or pump. Apply cold compresses pc x 15 minutes a few times a day for swelling or discomfort. May need to do this care for a couple of days. Infant had a 10.33 % weight loss. Mom's milk is coming in and she is pumping following feedings. Infant is latching better per mom since the frenulectomy. Has a follow up with ped tomorrow for weight check. Oppportunity for questions provided.

## 2017-09-26 NOTE — ROUTINE PROCESS
0800 Received OB SBAR report at bedside from INESSA Brice RN.  1145 Hourly rounds completed on patient from 0800 to 1145. Discharge instructions reviewed with patient and significant other. All questions answered. Patient discharged with infant.

## 2017-09-26 NOTE — ROUTINE PROCESS
Bedside shift change report given to INESSA Huffman RN (oncoming nurse) by Alejo Orellana RN (offgoing nurse). Report included the following information SBAR, Kardex, MAR, Accordion and Recent Results.

## 2022-03-19 PROBLEM — O99.891 BACK PAIN AFFECTING PREGNANCY IN THIRD TRIMESTER: Status: ACTIVE | Noted: 2017-09-21

## 2022-03-19 PROBLEM — M54.9 BACK PAIN AFFECTING PREGNANCY IN THIRD TRIMESTER: Status: ACTIVE | Noted: 2017-09-21

## 2022-08-22 LAB — HBA1C MFR BLD HPLC: 5 %

## 2022-09-27 ENCOUNTER — OFFICE VISIT (OUTPATIENT)
Dept: CARDIOLOGY CLINIC | Age: 32
End: 2022-09-27
Payer: COMMERCIAL

## 2022-09-27 VITALS
HEIGHT: 68 IN | HEART RATE: 88 BPM | RESPIRATION RATE: 16 BRPM | SYSTOLIC BLOOD PRESSURE: 112 MMHG | BODY MASS INDEX: 26.83 KG/M2 | WEIGHT: 177 LBS | DIASTOLIC BLOOD PRESSURE: 80 MMHG | OXYGEN SATURATION: 98 %

## 2022-09-27 DIAGNOSIS — Z82.49 FAMILY HISTORY OF EARLY CAD: ICD-10-CM

## 2022-09-27 DIAGNOSIS — R06.02 SHORT OF BREATH ON EXERTION: ICD-10-CM

## 2022-09-27 DIAGNOSIS — R00.2 PALPITATIONS: Primary | ICD-10-CM

## 2022-09-27 DIAGNOSIS — M35.00 SJOGREN SYNDROME, UNSPECIFIED (HCC): ICD-10-CM

## 2022-09-27 DIAGNOSIS — R07.9 CHEST PAIN, UNSPECIFIED TYPE: ICD-10-CM

## 2022-09-27 PROCEDURE — 93000 ELECTROCARDIOGRAM COMPLETE: CPT | Performed by: INTERNAL MEDICINE

## 2022-09-27 PROCEDURE — 99204 OFFICE O/P NEW MOD 45 MIN: CPT | Performed by: INTERNAL MEDICINE

## 2022-09-27 RX ORDER — CYCLOBENZAPRINE HCL 5 MG
5 TABLET ORAL
COMMUNITY

## 2022-09-27 RX ORDER — HYDROXYCHLOROQUINE SULFATE 200 MG/1
200 TABLET, FILM COATED ORAL 2 TIMES DAILY
COMMUNITY
Start: 2022-09-20

## 2022-09-27 RX ORDER — ALPRAZOLAM 0.5 MG/1
TABLET ORAL
COMMUNITY
Start: 2022-09-16

## 2022-09-27 NOTE — LETTER
Patient:  Beau Gerard   YOB: 1990  Date of Visit: 9/27/2022      Dear Abbe Lawson MD  [address] [place] [address] [address] [address]  Via Fax: [phone]: Thank you for referring Ms. Beau Gerard to me for evaluation/treatment. Below are the relevant portions of my assessment and plan of care. Ms. Morelia Man is a 35 yo F diagnosed with Sjogrens and Hashimoto's followed by rheumatology Dr. Chandra Parry, family history of early coronary artery disease, gastroesophageal reflux, borderline dyslipidemia referred by Dr. Chandra Parry for cardiac evaluation. She is here due to various symptoms but has been more short of breath with exertion since the birth of her son in 2017. Says she has had a 15 pound weight gain. There are times where she feels like she just cannot \"catch her breath\". She does not have exertional chest pain but does have occasional epigastric/chest discomfort approximately once a month that she attributes more to reflux. Symptoms 2018 she has had episodes of palpitations that occur about once or twice a month. It happens more so when she is tired or dehydrated. There are no associated symptoms of lightheadedness dizziness or shortness of breath. She is compensated on exam with clear lungs and no lower extremity edema. Her EKG is sinus bradycardia heart rate 57 nonspecific ST-T wave abnormality. Soc hx. No tobacco.  health care  Fam hx. Dad CAD/MI 46, tobacco use. Assessment/Plan:  1. Shortness of breath, chest pain. Symptoms with typical and atypical features and will proceed with a treadmill stress test and echo for further evaluation. 2. Palpitations. At this time, occuring very infrequent and inconsistent. We did talk about possible heart monitor in the future. She will let us know if this becomes an issue. 3. Gastroesophageal reflux. Occasional.  4. Family history of early coronary artery disease. 5. Borderline dyslipidemia. 6. Sjogrens, Hashimoto's. Followed by Rheumatology Dr. Elysia King. If you have questions, please do not hesitate to call me.      Sincerely,      Dougie Haider MD

## 2022-09-27 NOTE — PROGRESS NOTES
[facility] [place]: [place]  [phone]    HPI:   Ms. Meka Herzog is a 35 yo F diagnosed with Sjogrens and Hashimoto's followed by rheumatology Dr. Yg Bowie, family history of early coronary artery disease, gastroesophageal reflux, borderline dyslipidemia referred by Dr. Yg Bowie for cardiac evaluation. She is here due to various symptoms but has been more short of breath with exertion since the birth of her son in 2017. Says she has had a 15 pound weight gain. There are times where she feels like she just cannot \"catch her breath\". She does not have exertional chest pain but does have occasional epigastric/chest discomfort approximately once a month that she attributes more to reflux. Symptoms 2018 she has had episodes of palpitations that occur about once or twice a month. It happens more so when she is tired or dehydrated. There are no associated symptoms of lightheadedness dizziness or shortness of breath. She is compensated on exam with clear lungs and no lower extremity edema. Her EKG is sinus bradycardia heart rate 57 nonspecific ST-T wave abnormality. Soc hx. No tobacco.  health care  Fam hx. Dad CAD/MI 46, tobacco use. Assessment/Plan:  1. Shortness of breath, chest pain. Symptoms with typical and atypical features and will proceed with a treadmill stress test and echo for further evaluation. 2. Palpitations. At this time, occuring very infrequent and inconsistent. We did talk about possible heart monitor in the future. She will let us know if this becomes an issue. 3. Gastroesophageal reflux. Occasional.  4. Family history of early coronary artery disease. 5. Borderline dyslipidemia. 6. Sjogrens, Hashimoto's. Followed by Rheumatology Dr. Yg Bowie. She  has a past medical history of GERD (gastroesophageal reflux disease), Ichthyosis, MRSA (methicillin resistant staph aureus) culture positive, Psychiatric disorder (2015), Psychiatric problem (2015), and Syncope.     She has no past medical history of Abnormal Papanicolaou smear of cervix, Anemia, Breast disorder, Chlamydia, Complication of anesthesia, Disease of blood and blood forming organ, Epilepsy ([facility] [address] Essential hypertension, Genital herpes, Gestational diabetes, Gestational hypertension, Gonorrhea, Heart abnormality, Herpes gestationis, Herpes simplex virus (HSV) infection, Human immunodeficiency virus (HIV) disease ([facility] [address] Infertility, female, Kidney disease, Phlebitis and thrombophlebitis, Pituitary disorder ([facility] [address] Polycystic disease, ovaries, Postpartum depression, Rhesus isoimmunization affecting pregnancy, Sickle cell disease ([facility] [address] Sickle cell trait syndrome ([facility] [address] Syphilis, Systemic lupus erythematosus ([facility] [address] Thyroid activity decreased, or Trauma. All other systems negative except as above. PE  Vitals:    09/27/22 0828   BP: 112/80   Pulse: 88   Resp: 16   SpO2: 98%   Weight: 177 lb (80.3 kg)   Height: 5' 8\" (1.727 m)    Body mass index is 26.91 kg/m².   General appearance - alert, well appearing, and in no distress  Mental status - affect appropriate to mood  Eyes - sclera anicteric, moist mucous membranes  Neck - supple, no JVD  Chest - clear to auscultation, no wheezes, rales or rhonchi  Heart - normal rate, regular rhythm, normal S1, S2, no murmurs, rubs, clicks or gallops  Abdomen - soft, nontender, nondistended, no masses or organomegaly  Back exam - full range of motion, no tenderness  Neurological - no focal deficits  Extremities - peripheral pulses normal, no pedal edema      Recent Labs:  No results found for: CHOL, CHOLX, CHLST, CHOLV, 691871, HDL, HDLP, LDL, LDLC, DLDLP, TGLX, TRIGL, TRIGP, CHHD, CHHDX  No results found for: JEFFREY, CREAPOC, ACREA, CREA, REFC3, REFC4  No results found for: BUN, BUNPOC, IBUN, MBUNV, BUNV  No results found for: K, KI, PLK, WBK  No results found for: HBA1C, SKN9JELI, TGB7CPEO  Lab Results   Component Value Date/Time    HGB 10.6 (L) 09/24/2017 12:09 PM     Lab Results   Component Value Date/Time    PLATELET 989 51/05/4313 12:09 PM       Reviewed:  Past Medical History:   Diagnosis Date    GERD (gastroesophageal reflux disease)     Ichthyosis     MRSA (methicillin resistant staph aureus) culture positive     Psychiatric disorder 2015    ANXIETY    Psychiatric problem 2015    anxiety - not currently medicated    Syncope      Social History     Tobacco Use   Smoking Status Never   Smokeless Tobacco Never     Social History     Substance and Sexual Activity   Alcohol Use Not Currently     Allergies   Allergen Reactions    Bactrim [Sulfamethoprim] Rash       Current Outpatient Medications   Medication Sig    hydrOXYchloroQUINE (PLAQUENIL) 200 mg tablet Take 200 mg by mouth two (2) times a day. ALPRAZolam (XANAX) 0.5 mg tablet TAKE ONE TABLET BY MOUTH ONCE DAILY AS NEEDED    cyclobenzaprine (FLEXERIL) 5 mg tablet Take 5 mg by mouth daily as needed for Muscle Spasm(s). ALPRAZolam (XANAX) 0.25 mg tablet Take 0.25 mg by mouth two (2) times daily as needed for Anxiety. (Patient not taking: Reported on 9/27/2022)    famotidine (PEPCID) 40 mg tablet Take 40 mg by mouth daily as needed. (Patient not taking: Reported on 9/27/2022)     No current facility-administered medications for this visit.        Tg Mosqueda MD  [facility] [place]  [address] [facility]  [address]

## 2022-09-29 ENCOUNTER — ANCILLARY PROCEDURE (OUTPATIENT)
Dept: CARDIOLOGY CLINIC | Age: 32
End: 2022-09-29
Payer: COMMERCIAL

## 2022-09-29 VITALS
HEIGHT: 68 IN | BODY MASS INDEX: 26.83 KG/M2 | DIASTOLIC BLOOD PRESSURE: 82 MMHG | WEIGHT: 177 LBS | SYSTOLIC BLOOD PRESSURE: 120 MMHG

## 2022-09-29 DIAGNOSIS — R00.2 PALPITATIONS: ICD-10-CM

## 2022-09-29 DIAGNOSIS — R07.9 CHEST PAIN, UNSPECIFIED TYPE: ICD-10-CM

## 2022-09-29 DIAGNOSIS — M35.00 SJOGREN SYNDROME, UNSPECIFIED (HCC): ICD-10-CM

## 2022-09-29 DIAGNOSIS — R06.02 SHORT OF BREATH ON EXERTION: ICD-10-CM

## 2022-09-29 DIAGNOSIS — Z82.49 FAMILY HISTORY OF EARLY CAD: ICD-10-CM

## 2022-09-29 LAB
STRESS ANGINA INDEX: 0
STRESS BASELINE DIAS BP: 82 MMHG
STRESS BASELINE HR: 84 BPM
STRESS BASELINE ST DEPRESSION: 0 MM
STRESS BASELINE SYS BP: 120 MMHG
STRESS ESTIMATED WORKLOAD: 10.1 METS
STRESS EXERCISE DUR MIN: 9 MIN
STRESS EXERCISE DUR SEC: 0 SEC
STRESS O2 SAT PEAK: 100 %
STRESS O2 SAT REST: 99 %
STRESS PEAK DIAS BP: 80 MMHG
STRESS PEAK SYS BP: 160 MMHG
STRESS PERCENT HR ACHIEVED: 98 %
STRESS POST PEAK HR: 184 BPM
STRESS RATE PRESSURE PRODUCT: NORMAL BPM*MMHG
STRESS SR DUKE TREADMILL SCORE: 9
STRESS ST DEPRESSION: 0 MM
STRESS TARGET HR: 187 BPM

## 2022-09-29 PROCEDURE — 93015 CV STRESS TEST SUPVJ I&R: CPT | Performed by: INTERNAL MEDICINE

## 2022-09-30 ENCOUNTER — TELEPHONE (OUTPATIENT)
Dept: CARDIOLOGY CLINIC | Age: 32
End: 2022-09-30

## 2022-09-30 NOTE — TELEPHONE ENCOUNTER
MD Marie Urrutia, RN  Please let pt know stress test was normal. Thx    Two patient identifiers verified. Per MD patient called and given results. Confirmed follow up. Patient verbalized understanding and denies any further questions or concerns at this time.      Future Appointments   Date Time Provider Raymond Alonzo   11/7/2022  3:45 PM NANCY MARTINEZ AMB

## 2022-10-11 ENCOUNTER — TELEPHONE (OUTPATIENT)
Dept: NEUROLOGY | Age: 32
End: 2022-10-11

## 2022-11-02 ENCOUNTER — OFFICE VISIT (OUTPATIENT)
Dept: NEUROLOGY | Age: 32
End: 2022-11-02
Payer: COMMERCIAL

## 2022-11-02 DIAGNOSIS — R20.2 PARESTHESIA OF BOTH HANDS: ICD-10-CM

## 2022-11-02 DIAGNOSIS — R20.2 PARESTHESIA OF BOTH FEET: Primary | ICD-10-CM

## 2022-11-02 PROCEDURE — 95886 MUSC TEST DONE W/N TEST COMP: CPT | Performed by: PSYCHIATRY & NEUROLOGY

## 2022-11-02 PROCEDURE — 95913 NRV CNDJ TEST 13/> STUDIES: CPT | Performed by: PSYCHIATRY & NEUROLOGY

## 2022-11-02 NOTE — PROGRESS NOTES
[address]  [address], [facility] [address]  Phone [phone] Fax [phone]  Test Date:  2022    Patient: Blanquita Cabral : 1990 Physician: Nikos Johnston. Elba Coreas DO   Sex: Female Height: 5' 8\" Ref Phys: Lashaun Owens MD   ID#: [id number]  Weight: 177 lbs. Technician: Debora Zhang     Patient Complaints:  Bilateral distal upper and lower extremity paresthesias    NCV & EMG Findings:  All nerve conduction studies were within normal limits. All left vs. right side differences were within normal limits. All F Wave latencies were within normal limits. All examined muscles (as indicated in the following table) showed no evidence of electrical instability. Impression:  Extensive electrodiagnostic examination of the right lower and upper extremities and additional nerve conduction studies of the left lower extremity reveals no abnormalities. In particular, there is no evidence of a right lumbosacral or cervical motor radiculopathy. In addition, there is no evidence of a generalized large-fiber sensorimotor polyneuropathy. ___________________________  Amanda Coreas DO        Nerve Conduction Studies  Anti Sensory Summary Table     Stim Site NR Peak (ms) Norm Peak (ms) P-T Amp (µV) Norm P-T Amp Onset (ms) Site1 Site2 Delta-P (ms) Dist (cm) Bebo (m/s) Norm Bebo (m/s)   Right Median Anti Sensory (2nd Digit)  33.5°C   Wrist    2.5 <3.6 24.9 >10 2.1 Wrist 2nd Digit 2.5 14.0 56 >39   Right Radial Anti Sensory (Base 1st Digit)  33.5°C   Wrist    2.2 <3.1 47.4  1.7 Wrist Base 1st Digit 2.2 0.0     Left Sup Peroneal Anti Sensory (Ant Lat Mall)  31.6°C   14 cm    2.2 <4.4 5.5 >5.0 1.6 14 cm Ant Lat Mall 2.2 14.0 64 >32   Right Sup Peroneal Anti Sensory (Ant Lat Mall)  33.2°C   14 cm    2.0 <4.4 13.5 >5.0 1.5 14 cm Ant Lat Mall 2.0 14.0 70 >32   Left Sural Anti Sensory (Lat Mall)  31.7°C   Calf    3.0 <4.0 7.1 >5.0 2.7 Calf Lat Mall 3.0 14.0 47 >35   Right Sural Anti Sensory (Lat Mall)  32.4°C   Calf    2.8 <4.0 21.5 >5.0 2.3 Calf Lat Mall 2.8 14.0 50 >35   Right Ulnar Anti Sensory (5th Digit)  33.5°C   Wrist    2.2 <3.7 26.4 >15.0 1.6 Wrist 5th Digit 2.2 14.0 64 >38     Motor Summary Table     Stim Site NR Onset (ms) Norm Onset (ms) O-P Amp (mV) Norm O-P Amp Site1 Site2 Delta-0 (ms) Dist (cm) Bebo (m/s) Norm Bebo (m/s)   Right Median Motor (Abd Poll Brev)  33.5°C   Wrist    2.5 <4.2 6.1 >5 Elbow Wrist 4.9 28.0 57 >50   Elbow    7.4  5.6          Left Peroneal Motor (Ext Dig Brev)  32.6°C   Ankle    3.0 <6.1 4.1 >2.5 B Fib Ankle 7.7 38.0 49 >38   B Fib    10.7  4.0  Poplt B Fib 1.5 10.0 67 >40   Poplt    12.2  4.0          Right Peroneal Motor (Ext Dig Brev)  33°C   Ankle    3.0 <6.1 3.9 >2.5 B Fib Ankle 8.1 38.0 47 >38   B Fib    11.1  3.5  Poplt B Fib 1.7 10.0 59 >40   Poplt    12.8  3.3          Left Tibial Motor (Abd Severino Brev)  32.9°C   Ankle    2.9 <6.1 10.4 >3.0 Knee Ankle 10.4 42.0 40 >35   Knee    13.3  9.5          Right Tibial Motor (Abd Severino Brev)  33.3°C   Ankle    3.0 <6.1 8.8 >3.0 Knee Ankle 9.3 42.0 45 >35   Knee    12.3  8.4          Right Ulnar Motor (Abd Dig Minimi)  34.3°C   Wrist    2.4 <4.2 8.8 >3 B Elbow Wrist 2.8 17.0 61 >53   B Elbow    5.2  8.0  A Elbow B Elbow 1.8 10.0 56 >53   A Elbow    7.0  7.0            Comparison Summary Table     Stim Site NR Peak (ms) Norm Peak (ms) P-T Amp (µV) Site1 Site2 Delta-P (ms) Norm Delta (ms)   Right Median/Ulnar Palm Comparison (Wrist - 8cm)  34.1°C   Median Palm    1.5 <2.5 39.2 Median Palm Ulnar Palm 0.1 <0.3   Ulnar Palm    1.4 <2.5 36.6         F Wave Studies     NR F-Lat (ms) Lat Norm (ms) L-R F-Lat (ms) L-R Lat Norm   Right Tibial (Mrkrs) (Abd Hallucis)  33.3°C      55.67 <61  <5.7     EMG     Side Muscle Nerve Root Ins Act Fibs Psw Amp Dur Poly Recrt Int Pat Comment   Right Ext Dig Brev Dp Br Peronel L5, S1 Nml Nml Nml Nml Nml 0 Nml Nml    Right AbdHallucis MedPlantar S1-2 Nml Nml Nml Nml Nml 0 Nml Nml    Right PostTibialis Tibial L5, S1 Nml Nml Nml Nml Nml 0 Nml Nml    Right Gastroc Tibial S1-2 Nml Nml Nml Nml Nml 0 Nml Nml    Right AntTibialis Dp Br Peronel L4-5 Nml Nml Nml Nml Nml 0 Nml Nml    Right RectFemoris Femoral L2-4 Nml Nml Nml Nml Nml 0 Nml Nml    Right 1stDorInt Ulnar C8-T1 Nml Nml Nml Nml Nml 0 Nml Nml    Right FlexPolLong Median (Ant Int) C7-8 Nml Nml Nml Nml Nml 0 Nml Nml    Right Ext Indicis Radial (Post Int) C7-8 Nml Nml Nml Nml Nml 0 Nml Nml    Right Biceps Musculocut C5-6 Nml Nml Nml Nml Nml 0 Nml Nml    Right Triceps Radial C6-7-8 Nml Nml Nml Nml Nml 0 Nml Nml    Right Deltoid Axillary C5-6 Nml Nml Nml Nml Nml 0 Nml Nml          Waveforms:

## 2022-11-07 ENCOUNTER — ANCILLARY PROCEDURE (OUTPATIENT)
Dept: CARDIOLOGY CLINIC | Age: 32
End: 2022-11-07
Payer: COMMERCIAL

## 2022-11-07 VITALS
DIASTOLIC BLOOD PRESSURE: 82 MMHG | SYSTOLIC BLOOD PRESSURE: 120 MMHG | HEIGHT: 68 IN | WEIGHT: 177 LBS | BODY MASS INDEX: 26.83 KG/M2

## 2022-11-07 DIAGNOSIS — M35.00 SJOGREN SYNDROME, UNSPECIFIED (HCC): ICD-10-CM

## 2022-11-07 DIAGNOSIS — R06.02 SHORT OF BREATH ON EXERTION: ICD-10-CM

## 2022-11-07 DIAGNOSIS — Z82.49 FAMILY HISTORY OF EARLY CAD: ICD-10-CM

## 2022-11-07 DIAGNOSIS — R00.2 PALPITATIONS: ICD-10-CM

## 2022-11-07 DIAGNOSIS — R07.9 CHEST PAIN, UNSPECIFIED TYPE: ICD-10-CM

## 2022-11-07 LAB
ECHO AO ASC DIAM: 2.8 CM
ECHO AO ASCENDING AORTA INDEX: 1.44 CM/M2
ECHO AO ROOT DIAM: 3.3 CM
ECHO AO ROOT INDEX: 1.7 CM/M2
ECHO AV PEAK GRADIENT: 5 MMHG
ECHO AV PEAK VELOCITY: 1.2 M/S
ECHO AV VELOCITY RATIO: 0.83
ECHO EST RA PRESSURE: 3 MMHG
ECHO LA DIAMETER INDEX: 1.91 CM/M2
ECHO LA DIAMETER: 3.7 CM
ECHO LA TO AORTIC ROOT RATIO: 1.12
ECHO LA VOL 2C: 40 ML (ref 22–52)
ECHO LA VOL 4C: 29 ML (ref 22–52)
ECHO LA VOL BP: 34 ML (ref 22–52)
ECHO LA VOL/BSA BIPLANE: 18 ML/M2 (ref 16–34)
ECHO LA VOLUME AREA LENGTH: 37 ML
ECHO LA VOLUME INDEX A2C: 21 ML/M2 (ref 16–34)
ECHO LA VOLUME INDEX A4C: 15 ML/M2 (ref 16–34)
ECHO LA VOLUME INDEX AREA LENGTH: 19 ML/M2 (ref 16–34)
ECHO LV E' LATERAL VELOCITY: 21 CM/S
ECHO LV E' SEPTAL VELOCITY: 14 CM/S
ECHO LV EDV A2C: 82 ML
ECHO LV EDV A4C: 91 ML
ECHO LV EDV BP: 88 ML (ref 56–104)
ECHO LV EDV INDEX A4C: 47 ML/M2
ECHO LV EDV INDEX BP: 45 ML/M2
ECHO LV EDV NDEX A2C: 42 ML/M2
ECHO LV EJECTION FRACTION A2C: 64 %
ECHO LV EJECTION FRACTION A4C: 63 %
ECHO LV EJECTION FRACTION BIPLANE: 63 % (ref 55–100)
ECHO LV ESV A2C: 30 ML
ECHO LV ESV A4C: 33 ML
ECHO LV ESV BP: 32 ML (ref 19–49)
ECHO LV ESV INDEX A2C: 15 ML/M2
ECHO LV ESV INDEX A4C: 17 ML/M2
ECHO LV ESV INDEX BP: 16 ML/M2
ECHO LV FRACTIONAL SHORTENING: 35 % (ref 28–44)
ECHO LV INTERNAL DIMENSION DIASTOLE INDEX: 2.53 CM/M2
ECHO LV INTERNAL DIMENSION DIASTOLIC: 4.9 CM (ref 3.9–5.3)
ECHO LV INTERNAL DIMENSION SYSTOLIC INDEX: 1.65 CM/M2
ECHO LV INTERNAL DIMENSION SYSTOLIC: 3.2 CM
ECHO LV IVSD: 0.7 CM (ref 0.6–0.9)
ECHO LV MASS 2D: 110.8 G (ref 67–162)
ECHO LV MASS INDEX 2D: 57.1 G/M2 (ref 43–95)
ECHO LV POSTERIOR WALL DIASTOLIC: 0.7 CM (ref 0.6–0.9)
ECHO LV RELATIVE WALL THICKNESS RATIO: 0.29
ECHO LVOT PEAK GRADIENT: 4 MMHG
ECHO LVOT PEAK VELOCITY: 1 M/S
ECHO MV A VELOCITY: 0.57 M/S
ECHO MV E DECELERATION TIME (DT): 237 MS
ECHO MV E VELOCITY: 0.71 M/S
ECHO MV E/A RATIO: 1.25
ECHO MV E/E' LATERAL: 3.38
ECHO MV E/E' RATIO (AVERAGED): 4.23
ECHO MV E/E' SEPTAL: 5.07
ECHO RA AREA 4C: 12.4 CM2
ECHO RA END SYSTOLIC VOLUME APICAL 4 CHAMBER INDEX BSA: 13 ML/M2
ECHO RA VOLUME AREA LENGTH APICAL 4 CHAMBER: 27 ML
ECHO RA VOLUME: 26 ML
ECHO RIGHT VENTRICULAR SYSTOLIC PRESSURE (RVSP): 16 MMHG
ECHO RV BASAL DIMENSION: 3.3 CM
ECHO RV FREE WALL PEAK S': 16 CM/S
ECHO RV TAPSE: 2.8 CM (ref 1.7–?)
ECHO TV REGURGITANT MAX VELOCITY: 1.83 M/S
ECHO TV REGURGITANT PEAK GRADIENT: 13 MMHG

## 2022-11-07 PROCEDURE — 93306 TTE W/DOPPLER COMPLETE: CPT | Performed by: INTERNAL MEDICINE

## 2022-11-08 ENCOUNTER — TELEPHONE (OUTPATIENT)
Dept: CARDIOLOGY CLINIC | Age: 32
End: 2022-11-08

## 2022-11-08 NOTE — TELEPHONE ENCOUNTER
MD Dougie Euceda, RN  Please let pt know echo was overall normal. Thx    Left message asking for return

## 2022-11-10 ENCOUNTER — TELEPHONE (OUTPATIENT)
Dept: CARDIOLOGY CLINIC | Age: 32
End: 2022-11-10

## 2022-11-11 NOTE — TELEPHONE ENCOUNTER
MD Jojo Anderson, RN  Please let pt know echo was overall normal. thx     Two patient identifiers verified. Per MD patient called and given results. . Patient verbalized understanding and denies any further questions or concerns at this time.      Future Appointments   Date Time Provider Raymond Alonzo   3/23/2023  2:00 PM DO DASH Morris AMB

## 2022-11-24 ENCOUNTER — HOSPITAL ENCOUNTER (EMERGENCY)
Age: 32
Discharge: HOME | End: 2022-11-24
Attending: STUDENT IN AN ORGANIZED HEALTH CARE EDUCATION/TRAINING PROGRAM
Payer: COMMERCIAL

## 2022-11-24 ENCOUNTER — APPOINTMENT (OUTPATIENT)
Dept: ULTRASOUND IMAGING | Age: 32
End: 2022-11-24
Attending: NURSE PRACTITIONER
Payer: COMMERCIAL

## 2022-11-24 VITALS
OXYGEN SATURATION: 99 % | TEMPERATURE: 97.8 F | DIASTOLIC BLOOD PRESSURE: 58 MMHG | RESPIRATION RATE: 18 BRPM | SYSTOLIC BLOOD PRESSURE: 109 MMHG | HEART RATE: 94 BPM

## 2022-11-24 DIAGNOSIS — K80.50 BILIARY COLIC: Primary | ICD-10-CM

## 2022-11-24 DIAGNOSIS — R10.13 ABDOMINAL PAIN, EPIGASTRIC: ICD-10-CM

## 2022-11-24 LAB
ALBUMIN SERPL-MCNC: 3.8 G/DL (ref 3.5–5)
ALBUMIN/GLOB SERPL: 0.9 {RATIO} (ref 1.1–2.2)
ALP SERPL-CCNC: 99 U/L (ref 45–117)
ALT SERPL-CCNC: 29 U/L (ref 12–78)
ANION GAP SERPL CALC-SCNC: 6 MMOL/L (ref 5–15)
APPEARANCE UR: CLEAR
AST SERPL-CCNC: 11 U/L (ref 15–37)
BACTERIA URNS QL MICRO: ABNORMAL /HPF
BASOPHILS # BLD: 0 K/UL (ref 0–0.1)
BASOPHILS NFR BLD: 0 % (ref 0–1)
BILIRUB DIRECT SERPL-MCNC: 0.2 MG/DL (ref 0–0.2)
BILIRUB SERPL-MCNC: 0.6 MG/DL (ref 0.2–1)
BILIRUB UR QL: NEGATIVE
BUN SERPL-MCNC: 9 MG/DL (ref 6–20)
BUN/CREAT SERPL: 14 (ref 12–20)
CALCIUM SERPL-MCNC: 9 MG/DL (ref 8.5–10.1)
CHLORIDE SERPL-SCNC: 106 MMOL/L (ref 97–108)
CO2 SERPL-SCNC: 26 MMOL/L (ref 21–32)
COLOR UR: ABNORMAL
CREAT SERPL-MCNC: 0.65 MG/DL (ref 0.55–1.02)
DIFFERENTIAL METHOD BLD: ABNORMAL
EOSINOPHIL # BLD: 0 K/UL (ref 0–0.4)
EOSINOPHIL NFR BLD: 0 % (ref 0–7)
EPITH CASTS URNS QL MICRO: ABNORMAL /LPF
ERYTHROCYTE [DISTWIDTH] IN BLOOD BY AUTOMATED COUNT: 12.9 % (ref 11.5–14.5)
GLOBULIN SER CALC-MCNC: 4.1 G/DL (ref 2–4)
GLUCOSE SERPL-MCNC: 96 MG/DL (ref 65–100)
GLUCOSE UR STRIP.AUTO-MCNC: NEGATIVE MG/DL
HCT VFR BLD AUTO: 42.2 % (ref 35–47)
HGB BLD-MCNC: 14.3 G/DL (ref 11.5–16)
HGB UR QL STRIP: NEGATIVE
HYALINE CASTS URNS QL MICRO: ABNORMAL /LPF (ref 0–5)
IMM GRANULOCYTES # BLD AUTO: 0.1 K/UL (ref 0–0.04)
IMM GRANULOCYTES NFR BLD AUTO: 0 % (ref 0–0.5)
KETONES UR QL STRIP.AUTO: ABNORMAL MG/DL
LEUKOCYTE ESTERASE UR QL STRIP.AUTO: NEGATIVE
LIPASE SERPL-CCNC: 133 U/L (ref 73–393)
LYMPHOCYTES # BLD: 1.6 K/UL (ref 0.8–3.5)
LYMPHOCYTES NFR BLD: 12 % (ref 12–49)
MCH RBC QN AUTO: 29.5 PG (ref 26–34)
MCHC RBC AUTO-ENTMCNC: 33.9 G/DL (ref 30–36.5)
MCV RBC AUTO: 87 FL (ref 80–99)
MONOCYTES # BLD: 0.9 K/UL (ref 0–1)
MONOCYTES NFR BLD: 6 % (ref 5–13)
NEUTS SEG # BLD: 11.3 K/UL (ref 1.8–8)
NEUTS SEG NFR BLD: 82 % (ref 32–75)
NITRITE UR QL STRIP.AUTO: NEGATIVE
NRBC # BLD: 0 K/UL (ref 0–0.01)
NRBC BLD-RTO: 0 PER 100 WBC
PH UR STRIP: 6.5 [PH] (ref 5–8)
PLATELET # BLD AUTO: 257 K/UL (ref 150–400)
PMV BLD AUTO: 11.3 FL (ref 8.9–12.9)
POTASSIUM SERPL-SCNC: 3.9 MMOL/L (ref 3.5–5.1)
PROT SERPL-MCNC: 7.9 G/DL (ref 6.4–8.2)
PROT UR STRIP-MCNC: NEGATIVE MG/DL
RBC # BLD AUTO: 4.85 M/UL (ref 3.8–5.2)
RBC #/AREA URNS HPF: ABNORMAL /HPF (ref 0–5)
SODIUM SERPL-SCNC: 138 MMOL/L (ref 136–145)
SP GR UR REFRACTOMETRY: 1.02 (ref 1–1.03)
UR CULT HOLD, URHOLD: NORMAL
UROBILINOGEN UR QL STRIP.AUTO: 0.2 EU/DL (ref 0.2–1)
WBC # BLD AUTO: 13.9 K/UL (ref 3.6–11)
WBC URNS QL MICRO: ABNORMAL /HPF (ref 0–4)

## 2022-11-24 PROCEDURE — 36415 COLL VENOUS BLD VENIPUNCTURE: CPT

## 2022-11-24 PROCEDURE — 76705 ECHO EXAM OF ABDOMEN: CPT

## 2022-11-24 PROCEDURE — 83690 ASSAY OF LIPASE: CPT

## 2022-11-24 PROCEDURE — 99284 EMERGENCY DEPT VISIT MOD MDM: CPT

## 2022-11-24 PROCEDURE — 85025 COMPLETE CBC W/AUTO DIFF WBC: CPT

## 2022-11-24 PROCEDURE — 81001 URINALYSIS AUTO W/SCOPE: CPT

## 2022-11-24 PROCEDURE — 82248 BILIRUBIN DIRECT: CPT

## 2022-11-24 PROCEDURE — 80053 COMPREHEN METABOLIC PANEL: CPT

## 2022-11-24 PROCEDURE — 74011250636 HC RX REV CODE- 250/636: Performed by: NURSE PRACTITIONER

## 2022-11-24 PROCEDURE — 74011250637 HC RX REV CODE- 250/637: Performed by: NURSE PRACTITIONER

## 2022-11-24 PROCEDURE — 96374 THER/PROPH/DIAG INJ IV PUSH: CPT

## 2022-11-24 RX ORDER — KETOROLAC TROMETHAMINE 10 MG/1
10 TABLET, FILM COATED ORAL
Qty: 20 TABLET | Refills: 0 | Status: SHIPPED | OUTPATIENT
Start: 2022-11-24

## 2022-11-24 RX ORDER — FAMOTIDINE 20 MG/1
20 TABLET, FILM COATED ORAL 2 TIMES DAILY
Qty: 60 TABLET | Refills: 0 | Status: SHIPPED | OUTPATIENT
Start: 2022-11-24 | End: 2022-12-24

## 2022-11-24 RX ORDER — KETOROLAC TROMETHAMINE 30 MG/ML
15 INJECTION, SOLUTION INTRAMUSCULAR; INTRAVENOUS
Status: COMPLETED | OUTPATIENT
Start: 2022-11-24 | End: 2022-11-24

## 2022-11-24 RX ORDER — DICYCLOMINE HYDROCHLORIDE 10 MG/1
30 CAPSULE ORAL
Qty: 30 CAPSULE | Refills: 0 | Status: SHIPPED | OUTPATIENT
Start: 2022-11-24 | End: 2022-12-04

## 2022-11-24 RX ORDER — DICYCLOMINE HYDROCHLORIDE 10 MG/1
30 CAPSULE ORAL
Status: COMPLETED | OUTPATIENT
Start: 2022-11-24 | End: 2022-11-24

## 2022-11-24 RX ADMIN — KETOROLAC TROMETHAMINE 15 MG: 30 INJECTION, SOLUTION INTRAMUSCULAR; INTRAVENOUS at 15:40

## 2022-11-24 RX ADMIN — SODIUM CHLORIDE, POTASSIUM CHLORIDE, SODIUM LACTATE AND CALCIUM CHLORIDE 1000 ML: 600; 310; 30; 20 INJECTION, SOLUTION INTRAVENOUS at 15:40

## 2022-11-24 RX ADMIN — DICYCLOMINE HYDROCHLORIDE 30 MG: 10 CAPSULE ORAL at 15:41

## 2022-11-24 NOTE — ED PROVIDER NOTES
79-year-old female with a past medical history of GERD, chronic abdominal pain, Sjogren's syndrome, and anxiety presents the ER today for evaluation of abdominal pain. Patient states that she ate pizza yesterday evening around 6 PM and started develop a sensation of fullness and moderate to severe epigastric abdominal pain about an hour after eating. She states that she also had a little bit of dyspepsia and bloating. The pain predominantly stayed in her epigastrium, but she believes that it did radiate to her back at one point. She took ibuprofen (instructed by her rheumatologist due to possible autoimmune component to chronic abdominal pain) and famotidine with no improvement in her pain. She states that she woke up this morning with decreased appetite and ongoing pain. She tried to have a bowl of Cheerios which worsened her sensation of abdominal pain. She presents now with primary complaints of epigastric abdominal discomfort and a mild sense of bloating. She endorses a little bit of diarrhea, but she denies any fever/chills, significant nausea, vomiting, urinary complaints, flank pain. She is scheduled to see a gastroenterologist next month to establish care.        Past Medical History:   Diagnosis Date    GERD (gastroesophageal reflux disease)     Ichthyosis     MRSA (methicillin resistant staph aureus) culture positive     Psychiatric disorder 2015    ANXIETY    Psychiatric problem 2015    anxiety - not currently medicated    Syncope        Past Surgical History:   Procedure Laterality Date    HX  SECTION  2017    HX OTHER SURGICAL  2008    wisdom teeth removed         Family History:   Problem Relation Age of Onset    No Known Problems Mother     Hypertension Father     Diabetes Father     No Known Problems Sister     No Known Problems Son     Anesth Problems Neg Hx        Social History     Socioeconomic History    Marital status:      Spouse name: Not on file    Number of children: Not on file    Years of education: Not on file    Highest education level: Not on file   Occupational History    Not on file   Tobacco Use    Smoking status: Never    Smokeless tobacco: Never   Substance and Sexual Activity    Alcohol use: Not Currently    Drug use: No    Sexual activity: Yes     Partners: Male     Birth control/protection: None   Other Topics Concern    Not on file   Social History Narrative    Not on file     Social Determinants of Health     Financial Resource Strain: Not on file   Food Insecurity: Not on file   Transportation Needs: Not on file   Physical Activity: Not on file   Stress: Not on file   Social Connections: Not on file   Intimate Partner Violence: Not on file   Housing Stability: Not on file         ALLERGIES: Bactrim [sulfamethoprim]    Review of Systems   Constitutional:  Negative for fever. Gastrointestinal:  Positive for abdominal pain and diarrhea. Negative for vomiting. Genitourinary:  Negative for dysuria and flank pain. All other systems reviewed and are negative. Vitals:    11/24/22 1419   BP: (!) 109/58   Pulse: 94   Resp: 18   Temp: 97.8 °F (36.6 °C)   SpO2: 99%            Physical Exam  Vitals and nursing note reviewed. Constitutional:       Appearance: Normal appearance. She is obese. She is not ill-appearing. Comments: Appears uncomfortable, but not ill     HENT:      Head: Normocephalic and atraumatic. Right Ear: External ear normal.      Left Ear: External ear normal.      Nose: Nose normal.      Mouth/Throat:      Mouth: Mucous membranes are moist.      Pharynx: Oropharynx is clear. Eyes:      General: No scleral icterus. Extraocular Movements: Extraocular movements intact. Conjunctiva/sclera: Conjunctivae normal.      Pupils: Pupils are equal, round, and reactive to light. Cardiovascular:      Rate and Rhythm: Normal rate and regular rhythm. Pulses: Normal pulses. Heart sounds: Normal heart sounds.    Pulmonary: Effort: Pulmonary effort is normal.      Breath sounds: Normal breath sounds. Abdominal:      General: Abdomen is flat. Bowel sounds are normal. There is no distension. Palpations: Abdomen is soft. Tenderness: There is abdominal tenderness in the right upper quadrant and epigastric area. There is no right CVA tenderness or left CVA tenderness. Positive signs include Del Cid's sign. Musculoskeletal:         General: Normal range of motion. Cervical back: Normal range of motion and neck supple. No rigidity. No muscular tenderness. Skin:     General: Skin is warm and dry. Coloration: Skin is not pale. Neurological:      General: No focal deficit present. Mental Status: She is alert and oriented to person, place, and time. Psychiatric:         Mood and Affect: Mood normal.         Behavior: Behavior normal.         Thought Content: Thought content normal.         Judgment: Judgment normal.        MDM     VITAL SIGNS:  Patient Vitals for the past 4 hrs:   Temp Pulse Resp BP SpO2   11/24/22 1419 97.8 °F (36.6 °C) 94 18 (!) 109/58 99 %         LABS:  Recent Results (from the past 6 hour(s))   CBC WITH AUTOMATED DIFF    Collection Time: 11/24/22  2:53 PM   Result Value Ref Range    WBC 13.9 (H) 3.6 - 11.0 K/uL    RBC 4.85 3.80 - 5.20 M/uL    HGB 14.3 11.5 - 16.0 g/dL    HCT 42.2 35.0 - 47.0 %    MCV 87.0 80.0 - 99.0 FL    MCH 29.5 26.0 - 34.0 PG    MCHC 33.9 30.0 - 36.5 g/dL    RDW 12.9 11.5 - 14.5 %    PLATELET 184 009 - 874 K/uL    MPV 11.3 8.9 - 12.9 FL    NRBC 0.0 0  WBC    ABSOLUTE NRBC 0.00 0.00 - 0.01 K/uL    NEUTROPHILS 82 (H) 32 - 75 %    LYMPHOCYTES 12 12 - 49 %    MONOCYTES 6 5 - 13 %    EOSINOPHILS 0 0 - 7 %    BASOPHILS 0 0 - 1 %    IMMATURE GRANULOCYTES 0 0.0 - 0.5 %    ABS. NEUTROPHILS 11.3 (H) 1.8 - 8.0 K/UL    ABS. LYMPHOCYTES 1.6 0.8 - 3.5 K/UL    ABS. MONOCYTES 0.9 0.0 - 1.0 K/UL    ABS. EOSINOPHILS 0.0 0.0 - 0.4 K/UL    ABS.  BASOPHILS 0.0 0.0 - 0.1 K/UL ABS. IMM. GRANS. 0.1 (H) 0.00 - 0.04 K/UL    DF AUTOMATED     METABOLIC PANEL, COMPREHENSIVE    Collection Time: 11/24/22  2:53 PM   Result Value Ref Range    Sodium 138 136 - 145 mmol/L    Potassium 3.9 3.5 - 5.1 mmol/L    Chloride 106 97 - 108 mmol/L    CO2 26 21 - 32 mmol/L    Anion gap 6 5 - 15 mmol/L    Glucose 96 65 - 100 mg/dL    BUN 9 6 - 20 MG/DL    Creatinine 0.65 0.55 - 1.02 MG/DL    BUN/Creatinine ratio 14 12 - 20      eGFR >60 >60 ml/min/1.73m2    Calcium 9.0 8.5 - 10.1 MG/DL    Bilirubin, total 0.6 0.2 - 1.0 MG/DL    ALT (SGPT) 29 12 - 78 U/L    AST (SGOT) 11 (L) 15 - 37 U/L    Alk. phosphatase 99 45 - 117 U/L    Protein, total 7.9 6.4 - 8.2 g/dL    Albumin 3.8 3.5 - 5.0 g/dL    Globulin 4.1 (H) 2.0 - 4.0 g/dL    A-G Ratio 0.9 (L) 1.1 - 2.2     LIPASE    Collection Time: 11/24/22  2:53 PM   Result Value Ref Range    Lipase 133 73 - 393 U/L   BILIRUBIN, DIRECT    Collection Time: 11/24/22  2:53 PM   Result Value Ref Range    Bilirubin, direct 0.2 0.0 - 0.2 MG/DL   URINALYSIS W/MICROSCOPIC    Collection Time: 11/24/22  3:37 PM   Result Value Ref Range    Color YELLOW/STRAW      Appearance CLEAR CLEAR      Specific gravity 1.022 1.003 - 1.030      pH (UA) 6.5 5.0 - 8.0      Protein Negative NEG mg/dL    Glucose Negative NEG mg/dL    Ketone TRACE (A) NEG mg/dL    Bilirubin Negative NEG      Blood Negative NEG      Urobilinogen 0.2 0.2 - 1.0 EU/dL    Nitrites Negative NEG      Leukocyte Esterase Negative NEG      WBC 0-4 0 - 4 /hpf    RBC 0-5 0 - 5 /hpf    Epithelial cells FEW FEW /lpf    Bacteria 1+ (A) NEG /hpf    Hyaline cast 0-2 0 - 5 /lpf   URINE CULTURE HOLD SAMPLE    Collection Time: 11/24/22  3:37 PM    Specimen: Serum; Urine   Result Value Ref Range    Urine culture hold        Urine on hold in Microbiology dept for 2 days. If unpreserved urine is submitted, it cannot be used for addtional testing after 24 hours, recollection will be required.         IMAGING:  US ABD LTD   Final Result Cholelithiasis with pain on direct insonation of the gallbladder but   no other sonographic signs of acute cholecystitis. Medications During Visit:  Medications   dicyclomine (BENTYL) capsule 30 mg (30 mg Oral Given 11/24/22 1541)   ketorolac (TORADOL) injection 15 mg (15 mg IntraVENous Given 11/24/22 1540)   lactated ringers bolus infusion 1,000 mL (1,000 mL IntraVENous New Bag 11/24/22 1540)         DECISION MAKING:  Adelfa Soulier is a 34 y.o. female who comes in as above. Work-up significant for nonspecific leukocytosis (not much change from prior lab comparisons) with normal lipase and LFTs. Ultrasound shows gallstones without any evidence of cholecystitis. Patient is feeling significantly better after Toradol and dicyclomine. Plan:  Famotidine twice daily (differentials include gastritis)  Dicyclomine and Toradol as needed for pain  Follow-up with GI as planned next month  Referred to general surgery  Return for worsening pain or fevers. The clinical decision making for this encounter included ordering and interpreting the above diagnostic tests with comparison to prior studies that are within our EMR. Past medical and surgical histories were reviewed, as were records from recent outpatient and emergency department visits. The above results discussed and reviewed with the patient. Patient verbalized understanding of the care plan, including any changes to current outpatient medication regimen, discussed disease process, symptom control, and follow-up care. Return precautions reviewed. IMPRESSION:  1. Biliary colic    2. Abdominal pain, epigastric        DISPOSITION:  Discharged      Current Discharge Medication List        START taking these medications    Details   famotidine (PEPCID) 20 mg tablet Take 1 Tablet by mouth two (2) times a day for 30 days.   Qty: 60 Tablet, Refills: 0  Start date: 11/24/2022, End date: 12/24/2022      ketorolac (TORADOL) 10 mg tablet Take 1 Tablet by mouth every six (6) hours as needed for Pain. Qty: 20 Tablet, Refills: 0  Start date: 11/24/2022      dicyclomine (BENTYL) 10 mg capsule Take 3 Capsules by mouth three (3) times daily as needed for Cramping or Pain for up to 10 days. Qty: 30 Capsule, Refills: 0  Start date: 11/24/2022, End date: 12/4/2022              Follow-up Information       Follow up With Specialties Details Why Contact Info    Your gastroenterologist as planned next month  Go to       Joseph Treadwell MD General Surgery, [address] [place], [place] [id number] [phone]                The patient is asked to follow-up with their primary care provider in the next several days. They are to call tomorrow for an appointment. The patient is asked to return promptly for any increased concerns or worsening of symptoms. They can return to this emergency department or any other emergency department.       Procedures

## 2022-11-24 NOTE — ED TRIAGE NOTES
Pt c/o upper abd pain with cramping and bloating since last night approx 7pm. +watery diarrhea. Denies N/V. Pain worse with food.

## 2022-12-05 ENCOUNTER — OFFICE VISIT (OUTPATIENT)
Dept: SURGERY | Age: 32
End: 2022-12-05
Payer: COMMERCIAL

## 2022-12-05 VITALS
DIASTOLIC BLOOD PRESSURE: 75 MMHG | TEMPERATURE: 98 F | HEART RATE: 93 BPM | OXYGEN SATURATION: 99 % | RESPIRATION RATE: 18 BRPM | BODY MASS INDEX: 25.58 KG/M2 | SYSTOLIC BLOOD PRESSURE: 103 MMHG | WEIGHT: 168.8 LBS | HEIGHT: 68 IN

## 2022-12-05 DIAGNOSIS — K80.20 GALLSTONES: ICD-10-CM

## 2022-12-05 DIAGNOSIS — K21.9 GASTROESOPHAGEAL REFLUX DISEASE, UNSPECIFIED WHETHER ESOPHAGITIS PRESENT: Primary | ICD-10-CM

## 2022-12-05 PROCEDURE — 99204 OFFICE O/P NEW MOD 45 MIN: CPT | Performed by: SURGERY

## 2022-12-05 NOTE — H&P (VIEW-ONLY)
General Surgery History and Physical    CC: Abdominal pain    History of Present Illness:      Barbara Malcolm is a 34 y.o. female who presents with abdominal pain. Patient reports that a year and a half ago she had her last child. She had GERD with that pregnancy. She responded to Pepcid. She reports that since a year and a half ago she has had increased frequency of epigastric pain. It has required her to go to the ER repeatedly. The pain is described as a sharp stabbing pain that can be a 10 out of 10. Some radiation to her back. This is not improved with GI cocktails. Time and pain medication in the ER helped. She has had a few episodes that have followed pizza. She reports this pain feels different than her pregnancy GERD symptoms. Recently diagnosed with an autoimmune disease. This is being worked up further. She is going to see GI in the near future to also discuss further work-up. She has had some nausea but no vomiting. History of C-sections.     Past Medical History:   Diagnosis Date    GERD (gastroesophageal reflux disease)     Ichthyosis     MRSA (methicillin resistant staph aureus) culture positive     Psychiatric disorder     ANXIETY    Psychiatric problem 2015    anxiety - not currently medicated    Sjogren's disease ([facility] [address]     Syncope      Past Surgical History:   Procedure Laterality Date    HX  SECTION  2017    HX OTHER SURGICAL  2008    wisdom teeth removed      Family History   Problem Relation Age of Onset    No Known Problems Mother     Hypertension Father     Diabetes Father     No Known Problems Sister     No Known Problems Son     Anesth Problems Neg Hx      Social History     Socioeconomic History    Marital status:    Tobacco Use    Smoking status: Never    Smokeless tobacco: Never   Substance and Sexual Activity    Alcohol use: Not Currently    Drug use: No    Sexual activity: Yes     Partners: Male     Birth control/protection: None      Prior to Admission medications    Medication Sig Start Date End Date Taking? Authorizing Provider   hydrOXYchloroQUINE (PLAQUENIL) 200 mg tablet Take 200 mg by mouth two (2) times a day. 9/20/22  Yes Provider, Historical   cyclobenzaprine (FLEXERIL) 5 mg tablet Take 5 mg by mouth daily as needed for Muscle Spasm(s). Yes Provider, Historical   dicyclomine (BENTYL) 10 mg capsule Take 3 Capsules by mouth three (3) times daily as needed for Cramping or Pain for up to 10 days. 11/24/22 12/4/22  Marisol Larios NP     Allergies   Allergen Reactions    Bactrim [Sulfamethoprim] Rash       Review of Systems:  Constitutional: No fever or chills  Neurologic: No headache  Eyes: No scleral icterus or irritated eyes  Nose: No nasal pain or drainage  Mouth: No oral lesions or sore throat  Cardiac: No palpations or chest pain  Pulmonary: No cough or shortness of breath  Gastrointestinal: Abdominal pain and nausea, no emesis, diarrhea, or constipation  Genitourinary: No dysuria  Musculoskeletal: No muscle or joint tenderness  Skin: No rashes or lesions  Psychiatric: No anxiety or depressed mood    Physical Exam:   @TMAX[24@    Visit Vitals  /75 (BP 1 Location: Right arm, BP Patient Position: Sitting, BP Cuff Size: Adult)   Pulse 93   Temp 98 °F (36.7 °C) (Oral)   Resp 18   Ht 5' 8\" (1.727 m)   Wt 168 lb 12.8 oz (76.6 kg)   SpO2 99%   BMI 25.67 kg/m²     General: No acute distress, conversant  Eyes: PERRLA, no scleral icterus  HENT: Normocephalic without oral lesions  Neck: Trachea midline without LAD  Cardiac: Normal pulse rate and rhythm  Pulmonary: Symmetric chest rise with normal effort  GI: Soft, NT, ND, no hernia, no splenomegaly  Skin: Warm without rash  Extremities: No edema or joint stiffness  Psych: Appropriate mood and affect    Assessment:     34-year-old female with symptomatic cholelithiasis and possibly GERD    Plan:     Ultrasound reviewed showing stones, no CBD dilation.   Labs reviewed and normal.  I think she likely has symptomatic cholelithiasis. May also have some underlying GERD. Recommend laparoscopic cholecystectomy with also EGD to evaluate for GERD. We discussed the risk of bleeding, infection, biliary tree stone, cystic duct leak, biliary tree injury, liver injury, intestinal injury, and the risk of anesthesia. The patient understands these risks and elects to proceed. We will call to schedule at her convenience.       Signed By: Roger Watts MD  Bariatric and General Surgeon  [facility]    December 5, 2022

## 2022-12-05 NOTE — PROGRESS NOTES
Identified pt with two pt identifiers (name and ). Reviewed chart in preparation for visit and have obtained necessary documentation. Diane Barragan is a 34 y.o. female  Chief Complaint   Patient presents with    New Patient     Was seen in ED     Visit Vitals  /75 (BP 1 Location: Right arm, BP Patient Position: Sitting, BP Cuff Size: Adult)   Pulse 93   Temp 98 °F (36.7 °C) (Oral)   Resp 18   Ht 5' 8\" (1.727 m)   Wt 168 lb 12.8 oz (76.6 kg)   SpO2 99%   BMI 25.67 kg/m²       1. Have you been to the ER, urgent care clinic since your last visit? Hospitalized since your last visit? No    2. Have you seen or consulted any other health care providers outside of the [address] [place] [address] since your last visit? Include any pap smears or colon screening.  No

## 2022-12-05 NOTE — PROGRESS NOTES
General Surgery History and Physical    CC: Abdominal pain    History of Present Illness:      Meliton Murrell is a 34 y.o. female who presents with abdominal pain. Patient reports that a year and a half ago she had her last child. She had GERD with that pregnancy. She responded to Pepcid. She reports that since a year and a half ago she has had increased frequency of epigastric pain. It has required her to go to the ER repeatedly. The pain is described as a sharp stabbing pain that can be a 10 out of 10. Some radiation to her back. This is not improved with GI cocktails. Time and pain medication in the ER helped. She has had a few episodes that have followed pizza. She reports this pain feels different than her pregnancy GERD symptoms. Recently diagnosed with an autoimmune disease. This is being worked up further. She is going to see GI in the near future to also discuss further work-up. She has had some nausea but no vomiting. History of C-sections.     Past Medical History:   Diagnosis Date    GERD (gastroesophageal reflux disease)     Ichthyosis     MRSA (methicillin resistant staph aureus) culture positive     Psychiatric disorder     ANXIETY    Psychiatric problem 2015    anxiety - not currently medicated    Sjogren's disease ([facility] [address]     Syncope      Past Surgical History:   Procedure Laterality Date    HX  SECTION  2017    HX OTHER SURGICAL  2008    wisdom teeth removed      Family History   Problem Relation Age of Onset    No Known Problems Mother     Hypertension Father     Diabetes Father     No Known Problems Sister     No Known Problems Son     Anesth Problems Neg Hx      Social History     Socioeconomic History    Marital status:    Tobacco Use    Smoking status: Never    Smokeless tobacco: Never   Substance and Sexual Activity    Alcohol use: Not Currently    Drug use: No    Sexual activity: Yes     Partners: Male     Birth control/protection: None      Prior to Admission medications    Medication Sig Start Date End Date Taking? Authorizing Provider   hydrOXYchloroQUINE (PLAQUENIL) 200 mg tablet Take 200 mg by mouth two (2) times a day. 9/20/22  Yes Provider, Historical   cyclobenzaprine (FLEXERIL) 5 mg tablet Take 5 mg by mouth daily as needed for Muscle Spasm(s). Yes Provider, Historical   dicyclomine (BENTYL) 10 mg capsule Take 3 Capsules by mouth three (3) times daily as needed for Cramping or Pain for up to 10 days. 11/24/22 12/4/22  Kaia Ferris NP     Allergies   Allergen Reactions    Bactrim [Sulfamethoprim] Rash       Review of Systems:  Constitutional: No fever or chills  Neurologic: No headache  Eyes: No scleral icterus or irritated eyes  Nose: No nasal pain or drainage  Mouth: No oral lesions or sore throat  Cardiac: No palpations or chest pain  Pulmonary: No cough or shortness of breath  Gastrointestinal: Abdominal pain and nausea, no emesis, diarrhea, or constipation  Genitourinary: No dysuria  Musculoskeletal: No muscle or joint tenderness  Skin: No rashes or lesions  Psychiatric: No anxiety or depressed mood    Physical Exam:   @TMAX[24@    Visit Vitals  /75 (BP 1 Location: Right arm, BP Patient Position: Sitting, BP Cuff Size: Adult)   Pulse 93   Temp 98 °F (36.7 °C) (Oral)   Resp 18   Ht 5' 8\" (1.727 m)   Wt 168 lb 12.8 oz (76.6 kg)   SpO2 99%   BMI 25.67 kg/m²     General: No acute distress, conversant  Eyes: PERRLA, no scleral icterus  HENT: Normocephalic without oral lesions  Neck: Trachea midline without LAD  Cardiac: Normal pulse rate and rhythm  Pulmonary: Symmetric chest rise with normal effort  GI: Soft, NT, ND, no hernia, no splenomegaly  Skin: Warm without rash  Extremities: No edema or joint stiffness  Psych: Appropriate mood and affect    Assessment:     57-year-old female with symptomatic cholelithiasis and possibly GERD    Plan:     Ultrasound reviewed showing stones, no CBD dilation.   Labs reviewed and normal.  I think she likely has symptomatic cholelithiasis. May also have some underlying GERD. Recommend laparoscopic cholecystectomy with also EGD to evaluate for GERD. We discussed the risk of bleeding, infection, biliary tree stone, cystic duct leak, biliary tree injury, liver injury, intestinal injury, and the risk of anesthesia. The patient understands these risks and elects to proceed. We will call to schedule at her convenience.       Signed By: Laya Meadows MD  Bariatric and General Surgeon  [facility]    December 5, 2022

## 2022-12-15 ENCOUNTER — TRANSCRIBE ORDER (OUTPATIENT)
Dept: SCHEDULING | Age: 32
End: 2022-12-15

## 2022-12-15 DIAGNOSIS — R19.4 ALTERED BOWEL HABITS: ICD-10-CM

## 2022-12-15 DIAGNOSIS — R14.0 BLOATING: ICD-10-CM

## 2022-12-15 DIAGNOSIS — R63.4 LOSS OF WEIGHT: ICD-10-CM

## 2022-12-15 DIAGNOSIS — R68.81 EARLY SATIETY: ICD-10-CM

## 2022-12-15 DIAGNOSIS — R11.0 NAUSEA: ICD-10-CM

## 2022-12-15 DIAGNOSIS — R10.13 EPIGASTRIC PAIN: Primary | ICD-10-CM

## 2022-12-24 ENCOUNTER — HOSPITAL ENCOUNTER (EMERGENCY)
Age: 32
Discharge: HOME | End: 2022-12-24
Attending: EMERGENCY MEDICINE
Payer: COMMERCIAL

## 2022-12-24 ENCOUNTER — APPOINTMENT (OUTPATIENT)
Dept: ULTRASOUND IMAGING | Age: 32
End: 2022-12-24
Attending: NURSE PRACTITIONER
Payer: COMMERCIAL

## 2022-12-24 VITALS
SYSTOLIC BLOOD PRESSURE: 131 MMHG | HEART RATE: 98 BPM | OXYGEN SATURATION: 97 % | DIASTOLIC BLOOD PRESSURE: 89 MMHG | RESPIRATION RATE: 18 BRPM | TEMPERATURE: 97.1 F

## 2022-12-24 DIAGNOSIS — R11.0 NAUSEA WITHOUT VOMITING: ICD-10-CM

## 2022-12-24 DIAGNOSIS — R10.11 ABDOMINAL PAIN, RIGHT UPPER QUADRANT: ICD-10-CM

## 2022-12-24 DIAGNOSIS — K80.20 CALCULUS OF GALLBLADDER WITHOUT CHOLECYSTITIS WITHOUT OBSTRUCTION: Primary | ICD-10-CM

## 2022-12-24 LAB
ALBUMIN SERPL-MCNC: 3.7 G/DL (ref 3.5–5)
ALBUMIN/GLOB SERPL: 1 {RATIO} (ref 1.1–2.2)
ALP SERPL-CCNC: 96 U/L (ref 45–117)
ALT SERPL-CCNC: 42 U/L (ref 12–78)
ANION GAP SERPL CALC-SCNC: 6 MMOL/L (ref 5–15)
AST SERPL-CCNC: 40 U/L (ref 15–37)
BASOPHILS # BLD: 0.1 K/UL (ref 0–0.1)
BASOPHILS NFR BLD: 1 % (ref 0–1)
BILIRUB SERPL-MCNC: 0.5 MG/DL (ref 0.2–1)
BUN SERPL-MCNC: 13 MG/DL (ref 6–20)
BUN/CREAT SERPL: 15 (ref 12–20)
CALCIUM SERPL-MCNC: 9 MG/DL (ref 8.5–10.1)
CHLORIDE SERPL-SCNC: 105 MMOL/L (ref 97–108)
CO2 SERPL-SCNC: 25 MMOL/L (ref 21–32)
CREAT SERPL-MCNC: 0.88 MG/DL (ref 0.55–1.02)
DIFFERENTIAL METHOD BLD: ABNORMAL
EOSINOPHIL # BLD: 0.1 K/UL (ref 0–0.4)
EOSINOPHIL NFR BLD: 1 % (ref 0–7)
ERYTHROCYTE [DISTWIDTH] IN BLOOD BY AUTOMATED COUNT: 12.9 % (ref 11.5–14.5)
GLOBULIN SER CALC-MCNC: 3.7 G/DL (ref 2–4)
GLUCOSE SERPL-MCNC: 159 MG/DL (ref 65–100)
HCG SERPL QL: NEGATIVE
HCT VFR BLD AUTO: 40.6 % (ref 35–47)
HGB BLD-MCNC: 13.6 G/DL (ref 11.5–16)
IMM GRANULOCYTES # BLD AUTO: 0 K/UL (ref 0–0.04)
IMM GRANULOCYTES NFR BLD AUTO: 0 % (ref 0–0.5)
LIPASE SERPL-CCNC: 236 U/L (ref 73–393)
LYMPHOCYTES # BLD: 2.8 K/UL (ref 0.8–3.5)
LYMPHOCYTES NFR BLD: 27 % (ref 12–49)
MCH RBC QN AUTO: 29.6 PG (ref 26–34)
MCHC RBC AUTO-ENTMCNC: 33.5 G/DL (ref 30–36.5)
MCV RBC AUTO: 88.5 FL (ref 80–99)
MONOCYTES # BLD: 1.1 K/UL (ref 0–1)
MONOCYTES NFR BLD: 10 % (ref 5–13)
NEUTS SEG # BLD: 6.5 K/UL (ref 1.8–8)
NEUTS SEG NFR BLD: 61 % (ref 32–75)
NRBC # BLD: 0 K/UL (ref 0–0.01)
NRBC BLD-RTO: 0 PER 100 WBC
PLATELET # BLD AUTO: 226 K/UL (ref 150–400)
PMV BLD AUTO: 11.6 FL (ref 8.9–12.9)
POTASSIUM SERPL-SCNC: 3.2 MMOL/L (ref 3.5–5.1)
PROT SERPL-MCNC: 7.4 G/DL (ref 6.4–8.2)
RBC # BLD AUTO: 4.59 M/UL (ref 3.8–5.2)
SODIUM SERPL-SCNC: 136 MMOL/L (ref 136–145)
WBC # BLD AUTO: 10.4 K/UL (ref 3.6–11)

## 2022-12-24 PROCEDURE — 76705 ECHO EXAM OF ABDOMEN: CPT

## 2022-12-24 PROCEDURE — 96375 TX/PRO/DX INJ NEW DRUG ADDON: CPT

## 2022-12-24 PROCEDURE — 99284 EMERGENCY DEPT VISIT MOD MDM: CPT

## 2022-12-24 PROCEDURE — 84703 CHORIONIC GONADOTROPIN ASSAY: CPT

## 2022-12-24 PROCEDURE — 96374 THER/PROPH/DIAG INJ IV PUSH: CPT

## 2022-12-24 PROCEDURE — 74011250636 HC RX REV CODE- 250/636: Performed by: NURSE PRACTITIONER

## 2022-12-24 PROCEDURE — 80053 COMPREHEN METABOLIC PANEL: CPT

## 2022-12-24 PROCEDURE — 83690 ASSAY OF LIPASE: CPT

## 2022-12-24 PROCEDURE — 85025 COMPLETE CBC W/AUTO DIFF WBC: CPT

## 2022-12-24 RX ORDER — PROMETHAZINE HYDROCHLORIDE 25 MG/1
25 TABLET ORAL
Qty: 12 TABLET | Refills: 0 | Status: SHIPPED | OUTPATIENT
Start: 2022-12-24

## 2022-12-24 RX ORDER — ONDANSETRON 2 MG/ML
4 INJECTION INTRAMUSCULAR; INTRAVENOUS
Status: COMPLETED | OUTPATIENT
Start: 2022-12-24 | End: 2022-12-24

## 2022-12-24 RX ORDER — MORPHINE SULFATE 2 MG/ML
2 INJECTION, SOLUTION INTRAMUSCULAR; INTRAVENOUS
Status: COMPLETED | OUTPATIENT
Start: 2022-12-24 | End: 2022-12-24

## 2022-12-24 RX ORDER — TRAMADOL HYDROCHLORIDE 50 MG/1
50 TABLET ORAL
Qty: 12 TABLET | Refills: 0 | Status: SHIPPED | OUTPATIENT
Start: 2022-12-24 | End: 2022-12-27

## 2022-12-24 RX ADMIN — ONDANSETRON HYDROCHLORIDE 4 MG: 2 SOLUTION INTRAMUSCULAR; INTRAVENOUS at 22:40

## 2022-12-24 RX ADMIN — SODIUM CHLORIDE 1000 ML: 9 INJECTION, SOLUTION INTRAVENOUS at 22:40

## 2022-12-24 RX ADMIN — MORPHINE SULFATE 2 MG: 2 INJECTION, SOLUTION INTRAMUSCULAR; INTRAVENOUS at 22:40

## 2022-12-25 NOTE — ED TRIAGE NOTES
Pt arrives ambulatory from home for abdominal pain. She has a history of cholecystitis and is scheduled for surgery to correct this next month. She has been having several episodes of abdominal pain related to this and states this feels like similar episodes recently. A&OX4. Endorses nausea.

## 2022-12-25 NOTE — ED PROVIDER NOTES
HPI     Roni Muñoz is a 34 y.o. female with Hx of GERD, MRSA, anxiety, sjorgren's disease, cholelithiasis who presents ambulatory by herself to [facility] ED with cc of abdominal pain. Patient reports worsening right upper quadrant abdominal pain this evening with associated nausea. States that it occurred after she ate a snack. Has known gallbladder disease, scheduled for an elective lap cholecystectomy on . Reports that she took Toradol prior to arrival without relief of symptoms. States that she also currently has cold like symptoms. Reports nausea, but no vomiting. Has \"watery\" stools. Denies F/C, cough, congestion, CP, SOB, dysuria, urinary frequency/hesitancy, flank pain. Denies chance of pregnancy. Denies tobacco, alcohol, substance abuse. PCP: BESSY Newton    There are no other complaints, changes or physical findings at this time. Past Medical History:   Diagnosis Date    GERD (gastroesophageal reflux disease)     Ichthyosis     MRSA (methicillin resistant staph aureus) culture positive     Psychiatric disorder     ANXIETY    Psychiatric problem 2015    anxiety - not currently medicated    Sjogren's disease ([facility] [address]     Syncope        Past Surgical History:   Procedure Laterality Date    HX  SECTION  2017    HX OTHER SURGICAL  2008    wisdom teeth removed         Family History:   Problem Relation Age of Onset    No Known Problems Mother     Hypertension Father     Diabetes Father     No Known Problems Sister     No Known Problems Son     Anesth Problems Neg Hx        Social History     Socioeconomic History    Marital status:      Spouse name: Not on file    Number of children: Not on file    Years of education: Not on file    Highest education level: Not on file   Occupational History    Not on file   Tobacco Use    Smoking status: Never    Smokeless tobacco: Never   Substance and Sexual Activity    Alcohol use: Not Currently    Drug use:  No Sexual activity: Yes     Partners: Male     Birth control/protection: None   Other Topics Concern    Not on file   Social History Narrative    Not on file     Social Determinants of Health     Financial Resource Strain: Not on file   Food Insecurity: Not on file   Transportation Needs: Not on file   Physical Activity: Not on file   Stress: Not on file   Social Connections: Not on file   Intimate Partner Violence: Not on file   Housing Stability: Not on file         ALLERGIES: Bactrim [sulfamethoprim]    Review of Systems   Constitutional:  Positive for appetite change. Negative for activity change, chills and fever. HENT:  Negative for congestion, rhinorrhea and sore throat. Eyes:  Negative for visual disturbance. Respiratory:  Negative for cough and shortness of breath. Cardiovascular:  Negative for chest pain. Gastrointestinal:  Positive for abdominal pain and nausea. Negative for diarrhea and vomiting. Genitourinary:  Negative for dysuria, flank pain, frequency and urgency. Musculoskeletal:  Negative for arthralgias, back pain and neck pain. Skin:  Negative for color change and rash. Neurological:  Negative for dizziness, weakness and headaches. Psychiatric/Behavioral:  Negative for agitation, behavioral problems and confusion. All other systems reviewed and are negative. Vitals:    12/24/22 2206   BP: 131/89   Pulse: 98   Resp: 18   Temp: 97.1 °F (36.2 °C)   SpO2: 97%            Physical Exam  Vitals and nursing note reviewed. Constitutional:       General: She is not in acute distress. Appearance: She is well-developed. HENT:      Head: Normocephalic and atraumatic. Right Ear: External ear normal.      Left Ear: External ear normal.   Eyes:      Conjunctiva/sclera: Conjunctivae normal.      Pupils: Pupils are equal, round, and reactive to light. Cardiovascular:      Rate and Rhythm: Normal rate and regular rhythm. Heart sounds: Normal heart sounds.    Pulmonary: Effort: Pulmonary effort is normal.      Breath sounds: Normal breath sounds. Abdominal:      Palpations: Abdomen is soft. Tenderness: There is no abdominal tenderness. There is no guarding or rebound. Musculoskeletal:         General: Normal range of motion. Cervical back: Normal range of motion and neck supple. Skin:     General: Skin is warm and dry. Neurological:      Mental Status: She is alert and oriented to person, place, and time. Psychiatric:         Behavior: Behavior normal.         Thought Content: Thought content normal.         Judgment: Judgment normal.        MDM  Number of Diagnoses or Management Options  Abdominal pain, right upper quadrant  Calculus of gallbladder without cholecystitis without obstruction  Nausea without vomiting  Diagnosis management comments: Differential diagnosis includes peptic ulcer disease, gastroenteritis, cholelithiasis, cholecystitis    Patient presents the emergency department with known cholelithiasis, worsening right upper quadrant pain. LFTs and lipase were normal, abdomen was soft and nontender on exam, negative Del Cid sign. Ultrasound was obtained and without evidence of cholecystitis. Patient was provided with nausea and pain medication with improvement of symptoms. She was encouraged to make dietary modifications to continue ongoing management, prescribed nausea and pain medication at time of discharge. She was encouraged to follow-up with general surgery on Monday to discuss recent ER visit and possibility of moving surgical date. Reasons to return to the ER were provided and reviewed.        Amount and/or Complexity of Data Reviewed  Clinical lab tests: ordered and reviewed  Tests in the radiology section of CPT®: ordered and reviewed  Review and summarize past medical records: yes           Procedures    LABORATORY TESTS:  Recent Results (from the past 12 hour(s))   CBC WITH AUTOMATED DIFF    Collection Time: 12/24/22 10:14 PM Result Value Ref Range    WBC 10.4 3.6 - 11.0 K/uL    RBC 4.59 3.80 - 5.20 M/uL    HGB 13.6 11.5 - 16.0 g/dL    HCT 40.6 35.0 - 47.0 %    MCV 88.5 80.0 - 99.0 FL    MCH 29.6 26.0 - 34.0 PG    MCHC 33.5 30.0 - 36.5 g/dL    RDW 12.9 11.5 - 14.5 %    PLATELET 993 195 - 929 K/uL    MPV 11.6 8.9 - 12.9 FL    NRBC 0.0 0  WBC    ABSOLUTE NRBC 0.00 0.00 - 0.01 K/uL    NEUTROPHILS 61 32 - 75 %    LYMPHOCYTES 27 12 - 49 %    MONOCYTES 10 5 - 13 %    EOSINOPHILS 1 0 - 7 %    BASOPHILS 1 0 - 1 %    IMMATURE GRANULOCYTES 0 0.0 - 0.5 %    ABS. NEUTROPHILS 6.5 1.8 - 8.0 K/UL    ABS. LYMPHOCYTES 2.8 0.8 - 3.5 K/UL    ABS. MONOCYTES 1.1 (H) 0.0 - 1.0 K/UL    ABS. EOSINOPHILS 0.1 0.0 - 0.4 K/UL    ABS. BASOPHILS 0.1 0.0 - 0.1 K/UL    ABS. IMM. GRANS. 0.0 0.00 - 0.04 K/UL    DF AUTOMATED     METABOLIC PANEL, COMPREHENSIVE    Collection Time: 12/24/22 10:14 PM   Result Value Ref Range    Sodium 136 136 - 145 mmol/L    Potassium 3.2 (L) 3.5 - 5.1 mmol/L    Chloride 105 97 - 108 mmol/L    CO2 25 21 - 32 mmol/L    Anion gap 6 5 - 15 mmol/L    Glucose 159 (H) 65 - 100 mg/dL    BUN 13 6 - 20 MG/DL    Creatinine 0.88 0.55 - 1.02 MG/DL    BUN/Creatinine ratio 15 12 - 20      eGFR >60 >60 ml/min/1.73m2    Calcium 9.0 8.5 - 10.1 MG/DL    Bilirubin, total 0.5 0.2 - 1.0 MG/DL    ALT (SGPT) 42 12 - 78 U/L    AST (SGOT) 40 (H) 15 - 37 U/L    Alk. phosphatase 96 45 - 117 U/L    Protein, total 7.4 6.4 - 8.2 g/dL    Albumin 3.7 3.5 - 5.0 g/dL    Globulin 3.7 2.0 - 4.0 g/dL    A-G Ratio 1.0 (L) 1.1 - 2.2     LIPASE    Collection Time: 12/24/22 10:14 PM   Result Value Ref Range    Lipase 236 73 - 393 U/L   HCG QL SERUM    Collection Time: 12/24/22 10:14 PM   Result Value Ref Range    HCG, Ql. Negative NEG         IMAGING RESULTS:  US ABD LTD   Final Result   Cholelithiasis.       Otherwise unremarkable exam.                   MEDICATIONS GIVEN:  Medications   sodium chloride 0.9 % bolus infusion 1,000 mL (0 mL IntraVENous IV Completed 12/24/22 3537)   ondansetron (ZOFRAN) injection 4 mg (4 mg IntraVENous Given 12/24/22 2240)   morphine injection 2 mg (2 mg IntraVENous Given 12/24/22 2240)       IMPRESSION:  1. Calculus of gallbladder without cholecystitis without obstruction    2. Abdominal pain, right upper quadrant    3. Nausea without vomiting        PLAN:  1. Discharge Medication List as of 12/24/2022 11:40 PM        START taking these medications    Details   traMADoL (Ultram) 50 mg tablet Take 1 Tablet by mouth every six (6) hours as needed for Pain for up to 3 days. Max Daily Amount: 200 mg., Normal, Disp-12 Tablet, R-0      promethazine (PHENERGAN) 25 mg tablet Take 1 Tablet by mouth every six (6) hours as needed for Nausea., Normal, Disp-12 Tablet, R-0           CONTINUE these medications which have NOT CHANGED    Details   hydrOXYchloroQUINE (PLAQUENIL) 200 mg tablet Take 200 mg by mouth two (2) times a day., Historical Med           2. Follow-up Information       Follow up With Specialties Details Why Contact Info    Catrachito Self MD General Surgery, Bariatrics Schedule an appointment as soon as possible for a visit   [address] [facility] [address] [id number]  [phone]      [address] [facility] Go to  As needed, If symptoms worsen [address] [facility]  [phone]          3. Return to ED if worse     Please note that this dictation was completed with [employer], the computer voice recognition software. Quite often unanticipated grammatical, syntax, homophones, and other interpretive errors are inadvertently transcribed by the computer software. Please disregard these errors. Please excuse any errors that have escaped final proofreading.

## 2022-12-25 NOTE — DISCHARGE INSTRUCTIONS
You have gallstones with no evidence of galls bladder infection on your ultrasound. Your liver function are normal, the rest of your lab work is reassuring. Please call Dr. Jazmin Goldberg office on Monday, to see if you can get an earlier appointment. Clear liquid diet is recommended for the next 24 to 48 hours, continue to avoid all fatty foods as this can make your pain worsen. Medications were prescribed to help manage her symptoms. If you are taking Flexeril, do not mix it with tramadol the pain medication that was prescribed. Please return to the emergency department for any worsening or worrisome symptoms.

## 2023-01-03 ENCOUNTER — ANESTHESIA EVENT (OUTPATIENT)
Dept: SURGERY | Age: 33
End: 2023-01-03
Payer: COMMERCIAL

## 2023-01-03 RX ORDER — FAMOTIDINE 20 MG/1
20 TABLET, FILM COATED ORAL 2 TIMES DAILY
COMMUNITY

## 2023-01-03 NOTE — PERIOP NOTES
[address] INSTRUCTIONS    Surgery Date:   01/04/2022    Your surgeon's office or [facility] staff will call you between 4 PM- 8 PM the day before surgery with your arrival time. If your surgery is on a Monday, you will receive a call the preceding Friday. Please report to [facility] Patient Access/Admitting on the 1st floor. Bring your insurance card, photo identification, and any copayment ( if applicable). If you are going home the same day of your surgery, you must have a responsible adult to drive you home. You need to have a responsible adult to stay with you the first 24 hours after surgery and you should not drive a car for 24 hours following your surgery. Do NOT eat any solid foods after midnight the night before surgery including candy, mint or gum. You may drink clear liquids from midnight until 1 hour prior to your arrival. You may drink up to 12 ounces at one time every 4 hours. Please note special instructions, if applicable, below for medications. Do NOT drink alcohol or smoke 24 hours before surgery. STOP smoking for 14 days prior as it helps with breathing and healing after surgery. If you are being admitted to the hospital, please leave personal belongings/luggage in your car until you have an assigned hospital room number. Please wear comfortable clothes. Wear your glasses instead of contacts. We ask that all money, jewelry and valuables be left at home. Wear no make up, particularly mascara, the day of surgery. All body piercings, rings, and jewelry need to be removed and left at home. Please remove any nail polish or artifical nails from your fingernails. Please wear your hair loose or down. Please no pony-tails, buns, or any metal hair accessories. If you shower the morning of surgery, please do not apply any lotions or powders afterwards. You may wear deodorant, unless having breast surgery.   Do not shave any body area within 24 hours of your surgery. Please follow all instructions to avoid any potential surgical cancellation. Should your physical condition change, (i.e. fever, cold, flu, etc.) please notify your surgeon as soon as possible. It is important to be on time. If a situation occurs where you may be delayed, please call:  [phone] / [phone] on the day of surgery. The Preadmission Testing staff can be reached at [phone]. Special instructions: NONE      No current facility-administered medications for this encounter. Current Outpatient Medications   Medication Sig    Lactobacillus acidophilus (PROBIOTIC PO) Take 1 Tablet by mouth two (2) times a day. famotidine (PEPCID) 20 mg tablet Take 20 mg by mouth two (2) times a day. promethazine (PHENERGAN) 25 mg tablet Take 1 Tablet by mouth every six (6) hours as needed for Nausea. hydrOXYchloroQUINE (PLAQUENIL) 200 mg tablet Take 200 mg by mouth two (2) times a day. YOU MUST ONLY TAKE THESE MEDICATIONS THE MORNING OF SURGERY WITH A SIP OF WATER: PROBIOTIC, PEPCID, PLAQUENIL,     MEDICATIONS TO TAKE THE MORNING OF SURGERY ONLY IF NEEDED: TYLENOL,    HOLD these prescription medications BEFORE Surgery: PROMETHAZINE    Ask your surgeon/prescribing physician about when/if to STOP taking these medications: NONE    Stop all vitamins, herbal medicines and Aspirin containing products 7 days prior to surgery. Stop any non-steroidal anti-inflammatory drugs (i.e. Ibuprofen, Naproxen, Advil, Aleve) 3 days before surgery. You may take Tylenol. If you are currently taking Plavix, Coumadin,or any other blood-thinning/anticoagulant medication contact your prescribing physician for instructions. Eating and Drinking Before Surgery    You may eat a regular dinner at the usual time on the day before your surgery. Do NOT eat any solid foods after midnight unless your arrival time at the hospital is 3pm or later.   You may drink clear liquids only from 12 midnight until 1 hours prior to your arrival time at the hospital on the day of your surgery. Do NOT drink alcohol. Clear liquids include:  Water  Fruit juices without pulp( i.e. apple juice)  Carbonated beverages  Black coffee (no cream/milk)  Tea (no cream/milk)  Gatorade  You may drink up to 12-16 ounces at one time every 4 hours between the hours of midnight and 1 hour before your arrival time at the hospital. Example- if your arrival time at the hospital is 6am, you may drink 12-16 ounces of clear liquids no later than 5am.  If your arrival time at the hospital is 3pm or later, you may eat a light breakfast before 8am.  A light breakfast includes: Toast or bagel (no butter)  Black coffee (no cream/milk)  Tea (no cream/milk)  Fruit juices without pulp ( i.e. apple juice)  Do NOT eat meat, eggs, vegetables or fruit  If you have any questions, please contact your surgeon's office. Preventing Infections Before and After - Your Surgery    IMPORTANT INSTRUCTIONS    You play an important role in your health and preparation for surgery. To reduce the germs on your skin you will need to shower with CHG soap (Chorhexidine gluconate 4%) two times before surgery. CHG soap (Hibiclens, Hex-A-Clens or store brand)  CHG soap will be provided at your Preadmission Testing (PAT) appointment. If you do not have a PAT appointment before surgery, you may arrange to  CHG soap from our office or purchase CHG soap at a pharmacy, grocery or department store. You need to purchase TWO 4 ounce bottles to use for your 2 showers. Steps to follow:  Shelba Guy your hair with your normal shampoo and your body with regular soap and rinse well to remove shampoo and soap from your skin. Wet a clean washcloth and turn off the shower. Put CHG soap on washcloth and apply to your entire body from the neck down. Do not use on your head, face or private parts(genitals). Do not use CHG soap on open sores, wounds or areas of skin irritation.   Wash you body gently for 5 minutes. Do not wash your skin too hard. This soap does not create lather. Pay special attention to your underarms and from your belly button to your feet. Turn the shower back on and rinse well to get CHG soap off your body. Pat your skin dry with a clean, dry towel. Do not apply lotions or moisturizer. Put on clean clothes and sleep on fresh bed sheets and do not allow pets to sleep with you. Shower with CHG soap 2 times before your surgery  The evening before your surgery  The morning of your surgery      Tips to help prevent infections after your surgery:  Protect your surgical wound from germs:  Hand washing is the most important thing you and your caregivers can do to prevent infections. Keep your bandage clean and dry! Do not touch your surgical wound. Use clean, freshly washed towels and washcloths every time you shower; do not share bath linens with others. Until your surgical wound is healed, wear clothing and sleep on bed linens each day that are clean and freshly washed. Do not allow pets to sleep in your bed with you or touch your surgical wound. Do not smoke - smoking delays wound healing. This may be a good time to stop smoking. If you have diabetes, it is important for you to manage your blood sugar levels properly before your surgery as well as after your surgery. Poorly managed blood sugar levels slow down wound healing and prevent you from healing completely. Patient Information Regarding COVID Restrictions      Day of Procedure    Please park in the parking deck or any designated visitor parking lot. Enter the facility through the Main Entrance of the hospital.  On the day of surgery, please provide the cell phone number of the person who will be waiting for you to the Patient Access representative at the time of registration. Masks are highly recommended in the hospital, but not required.   Once your procedure and the immediate recovery period is completed, a nurse in the recovery area will contact your designated visitor to inform them of your room number or to otherwise review other pertinent information regarding your care. Social distancing practices are strongly encouraged in waiting areas and the cafeteria. The patient was contacted  via phone. She verbalized understanding of all instructions does not  need reinforcement.

## 2023-01-04 ENCOUNTER — ANESTHESIA (OUTPATIENT)
Dept: SURGERY | Age: 33
End: 2023-01-04
Payer: COMMERCIAL

## 2023-01-04 ENCOUNTER — HOSPITAL ENCOUNTER (OUTPATIENT)
Age: 33
Setting detail: OUTPATIENT SURGERY
Discharge: HOME | End: 2023-01-04
Attending: SURGERY | Admitting: SURGERY
Payer: COMMERCIAL

## 2023-01-04 ENCOUNTER — APPOINTMENT (OUTPATIENT)
Dept: GENERAL RADIOLOGY | Age: 33
End: 2023-01-04
Attending: SURGERY
Payer: COMMERCIAL

## 2023-01-04 VITALS
BODY MASS INDEX: 25.01 KG/M2 | SYSTOLIC BLOOD PRESSURE: 103 MMHG | OXYGEN SATURATION: 100 % | DIASTOLIC BLOOD PRESSURE: 61 MMHG | RESPIRATION RATE: 15 BRPM | TEMPERATURE: 97.5 F | WEIGHT: 165 LBS | HEIGHT: 68 IN | HEART RATE: 75 BPM

## 2023-01-04 DIAGNOSIS — M54.9 BACK PAIN AFFECTING PREGNANCY IN THIRD TRIMESTER: Primary | ICD-10-CM

## 2023-01-04 DIAGNOSIS — O99.891 BACK PAIN AFFECTING PREGNANCY IN THIRD TRIMESTER: Primary | ICD-10-CM

## 2023-01-04 DIAGNOSIS — K80.20 SYMPTOMATIC CHOLELITHIASIS: ICD-10-CM

## 2023-01-04 LAB — HCG UR QL: NEGATIVE

## 2023-01-04 PROCEDURE — 77030040361 HC SLV COMPR DVT MDII -B: Performed by: SURGERY

## 2023-01-04 PROCEDURE — 77030020053 HC ELECTRD LAPSCP COVD -B: Performed by: SURGERY

## 2023-01-04 PROCEDURE — 76210000016 HC OR PH I REC 1 TO 1.5 HR: Performed by: SURGERY

## 2023-01-04 PROCEDURE — 74011250636 HC RX REV CODE- 250/636: Performed by: ANESTHESIOLOGY

## 2023-01-04 PROCEDURE — 76210000020 HC REC RM PH II FIRST 0.5 HR: Performed by: SURGERY

## 2023-01-04 PROCEDURE — 77030002933 HC SUT MCRYL J&J -A: Performed by: SURGERY

## 2023-01-04 PROCEDURE — 2709999900 HC NON-CHARGEABLE SUPPLY: Performed by: SURGERY

## 2023-01-04 PROCEDURE — 81025 URINE PREGNANCY TEST: CPT

## 2023-01-04 PROCEDURE — 88304 TISSUE EXAM BY PATHOLOGIST: CPT

## 2023-01-04 PROCEDURE — 76010000149 HC OR TIME 1 TO 1.5 HR: Performed by: SURGERY

## 2023-01-04 PROCEDURE — 74011000250 HC RX REV CODE- 250: Performed by: SURGERY

## 2023-01-04 PROCEDURE — 77030010513 HC APPL CLP LIG J&J -C: Performed by: SURGERY

## 2023-01-04 PROCEDURE — 74011000250 HC RX REV CODE- 250: Performed by: ANESTHESIOLOGY

## 2023-01-04 PROCEDURE — 77030007955 HC PCH ENDOSC SPEC J&J -B: Performed by: SURGERY

## 2023-01-04 PROCEDURE — 77030008602 HC TRCR ENDOSC EPATH J&J -B: Performed by: SURGERY

## 2023-01-04 PROCEDURE — 74300 X-RAY BILE DUCTS/PANCREAS: CPT

## 2023-01-04 PROCEDURE — 74011000254 HC RX REV CODE- 254: Performed by: SURGERY

## 2023-01-04 PROCEDURE — 76060000033 HC ANESTHESIA 1 TO 1.5 HR: Performed by: SURGERY

## 2023-01-04 PROCEDURE — 74011000636 HC RX REV CODE- 636: Performed by: SURGERY

## 2023-01-04 PROCEDURE — 77030039895 HC SYST SMK EVAC LAP COVD -B: Performed by: SURGERY

## 2023-01-04 PROCEDURE — 77030008603 HC TRCR ENDOSC EPATH J&J -C: Performed by: SURGERY

## 2023-01-04 PROCEDURE — 77030008756 HC TU IRR SUC STRY -B: Performed by: SURGERY

## 2023-01-04 PROCEDURE — 77030004813 HC CATH CHOLGM TELE -B: Performed by: SURGERY

## 2023-01-04 PROCEDURE — 74011250637 HC RX REV CODE- 250/637: Performed by: ANESTHESIOLOGY

## 2023-01-04 PROCEDURE — 77030003666 HC NDL SPINAL BD -A: Performed by: SURGERY

## 2023-01-04 PROCEDURE — 77030010507 HC ADH SKN DERMBND J&J -B: Performed by: SURGERY

## 2023-01-04 PROCEDURE — 77030010031 HC SCIS ENDOSC MPLR J&J -C: Performed by: SURGERY

## 2023-01-04 PROCEDURE — 77030002967 HC SUT PDS J&J -B: Performed by: SURGERY

## 2023-01-04 PROCEDURE — 77030002966 HC SUT PDS J&J -A: Performed by: SURGERY

## 2023-01-04 RX ORDER — INDOCYANINE GREEN AND WATER 25 MG
2.5 KIT INJECTION
Status: COMPLETED | OUTPATIENT
Start: 2023-01-04 | End: 2023-01-04

## 2023-01-04 RX ORDER — FENTANYL CITRATE 50 UG/ML
25 INJECTION, SOLUTION INTRAMUSCULAR; INTRAVENOUS
Status: DISCONTINUED | OUTPATIENT
Start: 2023-01-04 | End: 2023-01-04 | Stop reason: HOSPADM

## 2023-01-04 RX ORDER — ONDANSETRON 2 MG/ML
4 INJECTION INTRAMUSCULAR; INTRAVENOUS AS NEEDED
Status: DISCONTINUED | OUTPATIENT
Start: 2023-01-04 | End: 2023-01-04 | Stop reason: HOSPADM

## 2023-01-04 RX ORDER — POLYETHYLENE GLYCOL 3350 17 G/17G
17 POWDER, FOR SOLUTION ORAL DAILY
Qty: 14 PACKET | Refills: 1 | Status: SHIPPED | OUTPATIENT
Start: 2023-01-04

## 2023-01-04 RX ORDER — HYDROMORPHONE HYDROCHLORIDE 1 MG/ML
0.2 INJECTION, SOLUTION INTRAMUSCULAR; INTRAVENOUS; SUBCUTANEOUS
Status: DISCONTINUED | OUTPATIENT
Start: 2023-01-04 | End: 2023-01-04 | Stop reason: HOSPADM

## 2023-01-04 RX ORDER — MIDAZOLAM HYDROCHLORIDE 1 MG/ML
INJECTION, SOLUTION INTRAMUSCULAR; INTRAVENOUS AS NEEDED
Status: DISCONTINUED | OUTPATIENT
Start: 2023-01-04 | End: 2023-01-04 | Stop reason: HOSPADM

## 2023-01-04 RX ORDER — LIDOCAINE HYDROCHLORIDE 20 MG/ML
INJECTION, SOLUTION EPIDURAL; INFILTRATION; INTRACAUDAL; PERINEURAL AS NEEDED
Status: DISCONTINUED | OUTPATIENT
Start: 2023-01-04 | End: 2023-01-04 | Stop reason: HOSPADM

## 2023-01-04 RX ORDER — SODIUM CHLORIDE 0.9 % (FLUSH) 0.9 %
5-40 SYRINGE (ML) INJECTION AS NEEDED
Status: DISCONTINUED | OUTPATIENT
Start: 2023-01-04 | End: 2023-01-04 | Stop reason: HOSPADM

## 2023-01-04 RX ORDER — SODIUM CHLORIDE 0.9 % (FLUSH) 0.9 %
5-40 SYRINGE (ML) INJECTION EVERY 8 HOURS
Status: DISCONTINUED | OUTPATIENT
Start: 2023-01-04 | End: 2023-01-04 | Stop reason: HOSPADM

## 2023-01-04 RX ORDER — FENTANYL CITRATE 50 UG/ML
INJECTION, SOLUTION INTRAMUSCULAR; INTRAVENOUS AS NEEDED
Status: DISCONTINUED | OUTPATIENT
Start: 2023-01-04 | End: 2023-01-04 | Stop reason: HOSPADM

## 2023-01-04 RX ORDER — OXYCODONE HYDROCHLORIDE 5 MG/1
5 TABLET ORAL
Status: COMPLETED | OUTPATIENT
Start: 2023-01-04 | End: 2023-01-04

## 2023-01-04 RX ORDER — SODIUM CHLORIDE, SODIUM LACTATE, POTASSIUM CHLORIDE, CALCIUM CHLORIDE 600; 310; 30; 20 MG/100ML; MG/100ML; MG/100ML; MG/100ML
INJECTION, SOLUTION INTRAVENOUS
Status: DISCONTINUED | OUTPATIENT
Start: 2023-01-04 | End: 2023-01-04 | Stop reason: HOSPADM

## 2023-01-04 RX ORDER — NORETHINDRONE AND ETHINYL ESTRADIOL 0.5-0.035
KIT ORAL AS NEEDED
Status: DISCONTINUED | OUTPATIENT
Start: 2023-01-04 | End: 2023-01-04 | Stop reason: HOSPADM

## 2023-01-04 RX ORDER — LIDOCAINE HYDROCHLORIDE 10 MG/ML
0.1 INJECTION, SOLUTION EPIDURAL; INFILTRATION; INTRACAUDAL; PERINEURAL AS NEEDED
Status: DISCONTINUED | OUTPATIENT
Start: 2023-01-04 | End: 2023-01-04 | Stop reason: HOSPADM

## 2023-01-04 RX ORDER — ONDANSETRON 2 MG/ML
INJECTION INTRAMUSCULAR; INTRAVENOUS AS NEEDED
Status: DISCONTINUED | OUTPATIENT
Start: 2023-01-04 | End: 2023-01-04 | Stop reason: HOSPADM

## 2023-01-04 RX ORDER — MORPHINE SULFATE 2 MG/ML
2 INJECTION, SOLUTION INTRAMUSCULAR; INTRAVENOUS
Status: DISCONTINUED | OUTPATIENT
Start: 2023-01-04 | End: 2023-01-04 | Stop reason: HOSPADM

## 2023-01-04 RX ORDER — CEFAZOLIN SODIUM 1 G/3ML
INJECTION, POWDER, FOR SOLUTION INTRAMUSCULAR; INTRAVENOUS AS NEEDED
Status: DISCONTINUED | OUTPATIENT
Start: 2023-01-04 | End: 2023-01-04 | Stop reason: HOSPADM

## 2023-01-04 RX ORDER — DEXAMETHASONE SODIUM PHOSPHATE 4 MG/ML
INJECTION, SOLUTION INTRA-ARTICULAR; INTRALESIONAL; INTRAMUSCULAR; INTRAVENOUS; SOFT TISSUE AS NEEDED
Status: DISCONTINUED | OUTPATIENT
Start: 2023-01-04 | End: 2023-01-04 | Stop reason: HOSPADM

## 2023-01-04 RX ORDER — PROPOFOL 10 MG/ML
INJECTION, EMULSION INTRAVENOUS AS NEEDED
Status: DISCONTINUED | OUTPATIENT
Start: 2023-01-04 | End: 2023-01-04 | Stop reason: HOSPADM

## 2023-01-04 RX ORDER — KETOROLAC TROMETHAMINE 30 MG/ML
INJECTION, SOLUTION INTRAMUSCULAR; INTRAVENOUS AS NEEDED
Status: DISCONTINUED | OUTPATIENT
Start: 2023-01-04 | End: 2023-01-04 | Stop reason: HOSPADM

## 2023-01-04 RX ORDER — BUPIVACAINE HYDROCHLORIDE AND EPINEPHRINE 5; 5 MG/ML; UG/ML
INJECTION, SOLUTION EPIDURAL; INTRACAUDAL; PERINEURAL AS NEEDED
Status: DISCONTINUED | OUTPATIENT
Start: 2023-01-04 | End: 2023-01-04 | Stop reason: HOSPADM

## 2023-01-04 RX ORDER — OXYCODONE AND ACETAMINOPHEN 5; 325 MG/1; MG/1
1 TABLET ORAL
Qty: 18 TABLET | Refills: 0 | Status: SHIPPED | OUTPATIENT
Start: 2023-01-04 | End: 2023-01-09

## 2023-01-04 RX ORDER — SODIUM CHLORIDE, SODIUM LACTATE, POTASSIUM CHLORIDE, CALCIUM CHLORIDE 600; 310; 30; 20 MG/100ML; MG/100ML; MG/100ML; MG/100ML
50 INJECTION, SOLUTION INTRAVENOUS CONTINUOUS
Status: DISCONTINUED | OUTPATIENT
Start: 2023-01-04 | End: 2023-01-04 | Stop reason: HOSPADM

## 2023-01-04 RX ORDER — PHENYLEPHRINE HCL IN 0.9% NACL 0.4MG/10ML
SYRINGE (ML) INTRAVENOUS AS NEEDED
Status: DISCONTINUED | OUTPATIENT
Start: 2023-01-04 | End: 2023-01-04 | Stop reason: HOSPADM

## 2023-01-04 RX ORDER — NEOSTIGMINE METHYLSULFATE 1 MG/ML
INJECTION, SOLUTION INTRAVENOUS AS NEEDED
Status: DISCONTINUED | OUTPATIENT
Start: 2023-01-04 | End: 2023-01-04 | Stop reason: HOSPADM

## 2023-01-04 RX ORDER — MIDAZOLAM HYDROCHLORIDE 1 MG/ML
0.5 INJECTION, SOLUTION INTRAMUSCULAR; INTRAVENOUS
Status: DISCONTINUED | OUTPATIENT
Start: 2023-01-04 | End: 2023-01-04 | Stop reason: HOSPADM

## 2023-01-04 RX ORDER — ROCURONIUM BROMIDE 10 MG/ML
INJECTION, SOLUTION INTRAVENOUS AS NEEDED
Status: DISCONTINUED | OUTPATIENT
Start: 2023-01-04 | End: 2023-01-04 | Stop reason: HOSPADM

## 2023-01-04 RX ADMIN — DEXAMETHASONE SODIUM PHOSPHATE 4 MG: 4 INJECTION, SOLUTION INTRAMUSCULAR; INTRAVENOUS at 08:25

## 2023-01-04 RX ADMIN — PROPOFOL 150 MG: 10 INJECTION, EMULSION INTRAVENOUS at 07:34

## 2023-01-04 RX ADMIN — ONDANSETRON HYDROCHLORIDE 4 MG: 2 INJECTION, SOLUTION INTRAMUSCULAR; INTRAVENOUS at 08:25

## 2023-01-04 RX ADMIN — INDOCYANINE GREEN AND WATER 2.5 MG: KIT at 06:39

## 2023-01-04 RX ADMIN — SODIUM CHLORIDE, POTASSIUM CHLORIDE, SODIUM LACTATE AND CALCIUM CHLORIDE: 600; 310; 30; 20 INJECTION, SOLUTION INTRAVENOUS at 07:27

## 2023-01-04 RX ADMIN — MIDAZOLAM 2 MG: 1 INJECTION INTRAMUSCULAR; INTRAVENOUS at 07:24

## 2023-01-04 RX ADMIN — LIDOCAINE HYDROCHLORIDE 40 MG: 20 INJECTION, SOLUTION EPIDURAL; INFILTRATION; INTRACAUDAL; PERINEURAL at 07:34

## 2023-01-04 RX ADMIN — FENTANYL CITRATE 100 MCG: 0.05 INJECTION, SOLUTION INTRAMUSCULAR; INTRAVENOUS at 08:26

## 2023-01-04 RX ADMIN — ONDANSETRON HYDROCHLORIDE 4 MG: 2 SOLUTION INTRAMUSCULAR; INTRAVENOUS at 08:47

## 2023-01-04 RX ADMIN — OXYCODONE 5 MG: 5 TABLET ORAL at 09:39

## 2023-01-04 RX ADMIN — KETOROLAC TROMETHAMINE 30 MG: 30 INJECTION, SOLUTION INTRAMUSCULAR; INTRAVENOUS at 08:27

## 2023-01-04 RX ADMIN — PROPOFOL 40 MG: 10 INJECTION, EMULSION INTRAVENOUS at 07:36

## 2023-01-04 RX ADMIN — ROCURONIUM BROMIDE 40 MG: 10 SOLUTION INTRAVENOUS at 07:34

## 2023-01-04 RX ADMIN — Medication 40 MCG: at 07:46

## 2023-01-04 RX ADMIN — CEFAZOLIN 2 G: 330 INJECTION, POWDER, FOR SOLUTION INTRAMUSCULAR; INTRAVENOUS at 07:37

## 2023-01-04 RX ADMIN — FENTANYL CITRATE 50 MCG: 0.05 INJECTION, SOLUTION INTRAMUSCULAR; INTRAVENOUS at 08:00

## 2023-01-04 RX ADMIN — FENTANYL CITRATE 100 MCG: 0.05 INJECTION, SOLUTION INTRAMUSCULAR; INTRAVENOUS at 07:33

## 2023-01-04 RX ADMIN — EPHEDRINE SULFATE 20 MG: 50 INJECTION INTRAVENOUS at 07:46

## 2023-01-04 RX ADMIN — NEOSTIGMINE METHYLSULFATE 3 MG: 1 INJECTION, SOLUTION INTRAVENOUS at 08:25

## 2023-01-04 RX ADMIN — SODIUM CHLORIDE, POTASSIUM CHLORIDE, SODIUM LACTATE AND CALCIUM CHLORIDE 50 ML/HR: 600; 310; 30; 20 INJECTION, SOLUTION INTRAVENOUS at 06:34

## 2023-01-04 NOTE — DISCHARGE INSTRUCTIONS
Pain Medication given at 9:30 am       Discharge Instructions for General Surgery Patients       Do not lift any objects weighing more than 15 pounds for 2 weeks. Do not do any housework including vacuuming, scrubbing or yardwork for 4 weeks. Do not drive or operate machinery while taking sedating or narcotic medications. Post operative pain is expected. Try to wean off narcotics as soon as able and take tylenol or NSAIDS. Do not take tylenol with Norco or Percocet as this may harm your liver. No NSAIDS if you are bariatric surgery patient. You may walk as desired and go up and down stairs as needed. Walking is encouraged. You may shower the day after surgery. Do not take tub baths, swim or use hot tubs for 2 weeks. Pat dry wounds after with a towel. Leave glue on wounds. It will fall off with time. Do not scrub around incisions. If redness develops around the glue okay to peel off in hot shower. Regular diet. Take Miralax once or twice a day as needed for constipation. Follow up with provider as scheduled. If you experience fever (greater than 101.5), chills, vomiting or redness or drainage at surgical site, please contact your surgeons office. If you have further questions or concerns, please call your surgeons office at [phone].      ______________________________________________________________________    Anesthesia Discharge Instructions    After general anesthesia or intervenous sedation, for 24 hours or while taking prescription Narcotics:  Limit your activities  Do not drive or operate hazardous machinery  If you have not urinated within 8 hours after discharge, please contact your surgeon on call.   Do not make important personal or business decisions  Do not drink alcoholic beverages    Report the following to your surgeon:  Excessive pain, swelling, redness or odor of or around the surgical area  Temperature over 100.5 degrees  Nausea and vomiting lasting longer than 4 hours or if unable to take medication  Any signs of decreased circulation or nerve impairment to extremity:  Change in color, persistent numbness, tingling, coldness or increased pain.   Any questions

## 2023-01-04 NOTE — INTERVAL H&P NOTE
Update History & Physical    The Patient's History and Physical of 12/5/22 was reviewed with the patient and I examined the patient. There was no change. The surgical site was confirmed by the patient and me. Plan for [address] given recent elevated LFT's    Plan:  The risk, benefits, expected outcome, and alternative to the recommended procedure have been discussed with the patient. Patient understands and wants to proceed with the procedure.     Electronically signed by Obdulia Little MD on 1/4/2023 at 6:59 AM

## 2023-01-04 NOTE — PROGRESS NOTES
Discharge instructions reviewed with patient and family using teachback . All questions have been answered. Prescriptions sent to [facility] pharmacy. Vital signs stable, pain appropriately managed. Patient wheeled off the unit with PACU staff.

## 2023-01-04 NOTE — ANESTHESIA PREPROCEDURE EVALUATION
Relevant Problems   No relevant active problems       Anesthetic History   No history of anesthetic complications            Review of Systems / Medical History  Patient summary reviewed, nursing notes reviewed and pertinent labs reviewed    Pulmonary  Within defined limits                 Neuro/Psych         Psychiatric history     Cardiovascular                  Exercise tolerance: >4 METS     GI/Hepatic/Renal     GERD           Endo/Other      Hypothyroidism       Other Findings              Physical Exam    Airway  Mallampati: I           Cardiovascular               Dental         Pulmonary                 Abdominal         Other Findings            Anesthetic Plan    ASA: 2  Anesthesia type: general          Induction: Intravenous  Anesthetic plan and risks discussed with: Patient

## 2023-01-04 NOTE — OP NOTES
OPERATIVE NOTE    Date of Procedure: 1/4/2023     Preoperative Diagnosis: SYMPTOMATIC CHOLELITHIASIS, GERD  Postoperative Diagnosis: SYMPTOMATIC CHOLELITHIASIS, GERD      Procedure: Procedure(s):  LAPAROSCOPIC CHOLECYSTECTOMY WITH INTRA OP CHOLANGIOGRAMS, ESOPHAGOGASTRODUODENOSCOPY    Surgeon: Aubrey Ayoub MD    Surgical Staff: Circ-1: Wilson Salgado RN  Circ-2: Petra Lehman RN  Circ-Relief: Cherie Resendiz  Scrub RN-1: Tabatha Medina RN  Surg Asst-1: Suzy Tony    Anesthesia: General   Indications: 33 y/o F with RUQ pain, gallstones, and LFT elevation here for lap ceci with IOC along with epigastric pain and GERD symptoms also requiring an EGD  Findings: Negative IOC, gallbladder small and contracted, normal EGD    Description of Operation: Genna Joseph was identified in the pre-operative holding area. Informed consent was obtained after a complete discussion of risks, benefits and alternatives to surgery were had with the patient. The patient was brought back to the operating room and placed under general endotracheal anesthesia in the supine position on the operating room table with a foot board. The patient was then prepped and draped in the usual sterile fashion. A timeout was performed. We then injected local anesthetic into the soniya-umbilical skin and subcutaneous tissue. A 12 mm incision was then made using an 11 blade. We dissected down to the abdominal wall fascia at the base of the umbilicus and this was grasped with a Kocher clamp and retracted anteriorly. A small incision was made in the linea alba and a hemostat was used to bluntly enter the abdomen. A 12 mm trocar was safely placed after we confirmed proper location and the abdomen was insufflated to 15 mm Hg. The patient was placed in steep reverse Trendelenburg with the table tilted to the left.  We then placed three additional 5 mm trocars using direct visualization under the right costal margin after the injection of local anesthetic. The dome of the gallbladder was grasped and retracted anteriorly and laterally over the liver edge. The gallbladder appeared normal. The infundibulum was grasped with a giulia and retracted laterally. Using the hook cautery, the peritoneum of the gallbladder was incised around the infundibulum and lateral boarders. We identified the cystic duct and cystic artery and after further dissection obtained the critical view of safety. ICG was used to confirm anatomy. We then placed two 5mm clips proximally and one distally on the cystic artery and transected it. Given the operative indication, an intraoperative cholangiogram was performed. This was done by placing one clip distally on the cystic duct near the infundibulum followed by a small ductotomy created with laparoscopic scissors. An Arrow catheter was then placed into the cystic duct opening and secured with one 5 mm clip. Saline flushed well. Next, water soluble contrast solution was injected into the cystic duct. The findings demonstrated: Initially sluggish filling of the CBD but then it promptly flushed into the duodenum. Good filling of common hepatic duct, no obvious filling defects. After completion of the IOC, the clip and catheter were removed. Two clips were then placed on the proximal cystic duct and the duct was then completely transected with scissors. The cystic duct appeared to be a little bit large so I elected to place a 0 PDS Endoloop proximal to my clips to further ensure occlusion of the cystic duct at the CBD junction. The gallbladder was then liberated from the gallbladder fossa and placed in an Endo-catch bag. Hemostasis was confirmed and we ensured no spilled stones. We irrigated and suctioned out spilled blood and spilled bile from the [employer]. Next, we closed the 12 mm periumbilical trocar site with 0 PDS on a trans-fascial suture passer.  We removed the three 5mm trocars under direct visualization to ensure no hemorrhage. We then released the pneumoperitoneum and removed the 12 mm trocar. The specimen was removed via the umbilical trocar site. The PDS suture was tied down. The dermis was approximated with 4-0 Monocryl suture followed by Dermabond for the skin. I then went up top and performed an EGD to evaluate her GERD symptoms. I advanced the endoscope carefully down the oropharynx into the esophagus. The esophagus appeared to be normal.  No signs of esophagitis. The GE junction was at 40 cm. This was normal-appearing. I advanced into the stomach. No signs of gastritis. No ulcers. I advanced into the duodenal bulb. There was residual contrast material in there. No signs of duodenitis or ulcers. I then pulled out the scope and retroflexed. No signs of hiatal hernia. Hill grade 1 valve. I then removed all insufflation and carefully remove the endoscope. At the end of the procedure all instrument, needle, and sponge counts were correct. I was present and scrubbed throughout the entirety of the case. The patient awoke from anesthesia and was extubated without complication and sent to PACU in stable condition.       Estimated Blood Loss: 5 mL    Specimens:   ID Type Source Tests Collected by Time Destination   1 : Gallbladder Fresh Gallbladder  Virgie Pereira MD 1/4/2023 8940 Pathology        Complications: None    Implants: * No implants in log *      Army Mitra MD  Bariatric and General Surgeon  [facility]  1/4/2023

## 2023-01-04 NOTE — ANESTHESIA POSTPROCEDURE EVALUATION
Post-Anesthesia Evaluation and Assessment    Patient: Jayesh Berg MRN: [id number]  SSN: [id number]    YOB: 1990  Age: 34 y.o. Sex: female      I have evaluated the patient and they are stable and ready for discharge from the PACU. Cardiovascular Function/Vital Signs  Visit Vitals  /61 (BP Patient Position: At rest)   Pulse 75   Temp 36.4 °C (97.5 °F)   Resp 15   Ht 5' 8\" (1.727 m)   Wt 74.8 kg (165 lb)   SpO2 100%   BMI 25.09 kg/m²       Patient is status post General anesthesia for Procedure(s):  LAPAROSCOPIC CHOLECYSTECTOMY WITH INTRA OP CHOLANGIOGRAMS, ESOPHAGOGASTRODUODENOSCOPY. Nausea/Vomiting: None    Postoperative hydration reviewed and adequate. Pain:  Pain Scale 1: Numeric (0 - 10) (01/04/23 0923)  Pain Intensity 1: 3 (01/04/23 0923)   Managed    Neurological Status:   Neuro (WDL): Within Defined Limits (01/04/23 0923)  Neuro  Neurologic State: Alert (01/04/23 4516)  Orientation Level: Oriented X4 (01/04/23 0923)  Cognition: Follows commands (01/04/23 0923)  Speech: Clear (01/04/23 0923)  LUE Motor Response: Purposeful (01/04/23 0923)  LLE Motor Response: Purposeful (01/04/23 0923)  RUE Motor Response: Purposeful (01/04/23 1303)  RLE Motor Response: Purposeful (01/04/23 0923)   At baseline    Mental Status, Level of Consciousness: Alert and  oriented to person, place, and time    Pulmonary Status:   O2 Device: None (Room air) (01/04/23 0949)   Adequate oxygenation and airway patent    Complications related to anesthesia: None    Post-anesthesia assessment completed. No concerns    Signed By: Megha Meyer MD     January 4, 2023              Procedure(s):  LAPAROSCOPIC CHOLECYSTECTOMY WITH INTRA OP CHOLANGIOGRAMS, ESOPHAGOGASTRODUODENOSCOPY.     general    <BSHSIANPOST>    INITIAL Post-op Vital signs:   Vitals Value Taken Time   /61 01/04/23 0949   Temp 36.4 °C (97.5 °F) 01/04/23 0949   Pulse 70 01/04/23 0951   Resp 13 01/04/23 0951   SpO2 100 % 01/04/23 0951   Vitals shown include unvalidated device data.

## 2023-01-19 ENCOUNTER — VIRTUAL VISIT (OUTPATIENT)
Dept: SURGERY | Age: 33
End: 2023-01-19
Payer: COMMERCIAL

## 2023-01-19 VITALS — WEIGHT: 164 LBS | BODY MASS INDEX: 24.86 KG/M2 | HEIGHT: 68 IN

## 2023-01-19 DIAGNOSIS — Z09 POSTOPERATIVE EXAMINATION: Primary | ICD-10-CM

## 2023-01-19 PROCEDURE — 99024 POSTOP FOLLOW-UP VISIT: CPT | Performed by: NURSE PRACTITIONER

## 2023-01-19 NOTE — PROGRESS NOTES
Subjective:      Ervin Greene is a 34 y.o. female presents for postop care 2 weeks following Laparoscopic Cholecystrectomy    Appetite is good. Eating a regular diet without difficulty. Bowel movements are regular. No complaints. Ms. Erika Bush has a reminder for a \"due or due soon\" health maintenance. I have asked that she contact her primary care provider for follow-up on this health maintenance. Objective:     Visit Vitals  Ht 5' 8\" (1.727 m)   Wt 164 lb (74.4 kg)   BMI 24.94 kg/m²       General:  alert, cooperative, no distress       Incision:   Healing well per patient. Assessment:     Doing well postoperatively. Plan:     Follow up as needed  May increase activity as tolerated. No lifting greater than 20 lbs x 1 week then may increase by 10 lbs each week. Path reviewed with patient. Diana Tovar NP  Ervin Greene, was evaluated through a synchronous (real-time) audio-video encounter. The patient (or guardian if applicable) is aware that this is a billable service, which includes applicable co-pays. This Virtual Visit was conducted with patient's (and/or legal guardian's) consent. The visit was conducted pursuant to the emergency declaration under the [address] [place] [address], [address] [place] [address] authority and the [employer] General Act. Patient identification was verified, and a caregiver was present when appropriate. The patient was located at: Home: [address] [place] [id number]  The provider was located at: Facility ([facility] Department): [address]  [address] [address]      --Diana Tovar NP on 1/19/2023 at 9:04 AM    An electronic signature was used to authenticate this note.

## 2023-01-19 NOTE — PROGRESS NOTES
Identified pt with two pt identifiers (name and ). Reviewed chart in preparation for visit and have obtained necessary documentation. Aguila Jain is a 34 y.o. female  Chief Complaint   Patient presents with    Post OP Follow Up     2 weeks s/p DAWIT hadley. Visit Vitals  Ht 5' 8\" (1.727 m)   Wt 164 lb (74.4 kg)   BMI 24.94 kg/m²       1. Have you been to the ER, urgent care clinic since your last visit? Hospitalized since your last visit? No    2. Have you seen or consulted any other health care providers outside of the [address] [place] [address] since your last visit? Include any pap smears or colon screening.  Yes PCP

## 2023-02-22 ENCOUNTER — HOSPITAL ENCOUNTER (OUTPATIENT)
Dept: CT IMAGING | Age: 33
Discharge: HOME | End: 2023-02-22
Attending: INTERNAL MEDICINE
Payer: COMMERCIAL

## 2023-02-22 DIAGNOSIS — R14.0 BLOATING: ICD-10-CM

## 2023-02-22 DIAGNOSIS — R19.4 ALTERED BOWEL HABITS: ICD-10-CM

## 2023-02-22 DIAGNOSIS — R11.0 NAUSEA: ICD-10-CM

## 2023-02-22 DIAGNOSIS — R10.13 EPIGASTRIC PAIN: ICD-10-CM

## 2023-02-22 DIAGNOSIS — R68.81 EARLY SATIETY: ICD-10-CM

## 2023-02-22 DIAGNOSIS — R63.4 LOSS OF WEIGHT: ICD-10-CM

## 2023-02-22 PROCEDURE — 74011000636 HC RX REV CODE- 636: Performed by: INTERNAL MEDICINE

## 2023-02-22 PROCEDURE — 74177 CT ABD & PELVIS W/CONTRAST: CPT

## 2023-02-22 RX ADMIN — IOPAMIDOL 100 ML: 755 INJECTION, SOLUTION INTRAVENOUS at 17:40

## 2023-02-28 ENCOUNTER — DOCUMENTATION ONLY (OUTPATIENT)
Dept: HEMATOLOGY | Age: 33
End: 2023-02-28

## 2023-02-28 ENCOUNTER — TELEPHONE (OUTPATIENT)
Dept: CARDIOLOGY CLINIC | Age: 33
End: 2023-02-28

## 2023-02-28 ENCOUNTER — OFFICE VISIT (OUTPATIENT)
Dept: CARDIOLOGY CLINIC | Age: 33
End: 2023-02-28
Payer: COMMERCIAL

## 2023-02-28 VITALS
BODY MASS INDEX: 24.55 KG/M2 | RESPIRATION RATE: 16 BRPM | HEIGHT: 68 IN | HEART RATE: 91 BPM | OXYGEN SATURATION: 99 % | WEIGHT: 162 LBS | DIASTOLIC BLOOD PRESSURE: 60 MMHG | SYSTOLIC BLOOD PRESSURE: 100 MMHG

## 2023-02-28 DIAGNOSIS — Z82.49 FAMILY HISTORY OF EARLY CAD: ICD-10-CM

## 2023-02-28 DIAGNOSIS — R00.2 PALPITATIONS: Primary | ICD-10-CM

## 2023-02-28 DIAGNOSIS — M35.00 SJOGREN SYNDROME, UNSPECIFIED (HCC): ICD-10-CM

## 2023-02-28 PROCEDURE — 99214 OFFICE O/P EST MOD 30 MIN: CPT | Performed by: INTERNAL MEDICINE

## 2023-02-28 RX ORDER — MULTIVIT WITH MINERALS/HERBS
1 TABLET ORAL DAILY
COMMUNITY

## 2023-02-28 RX ORDER — DULOXETIN HYDROCHLORIDE 30 MG/1
30 CAPSULE, DELAYED RELEASE ORAL DAILY
COMMUNITY

## 2023-02-28 RX ORDER — FAMOTIDINE 20 MG/50ML
INJECTION, SOLUTION INTRAVENOUS
COMMUNITY
Start: 2022-12-26 | End: 2023-02-28

## 2023-02-28 RX ORDER — METHOCARBAMOL 500 MG/1
TABLET, FILM COATED ORAL
COMMUNITY

## 2023-02-28 RX ORDER — MAGNESIUM 250 MG
TABLET ORAL DAILY
COMMUNITY

## 2023-02-28 NOTE — LETTER
Patient:  Grayson Posada   YOB: 1990  Date of Visit: 2/28/2023      Dear Margarito Tillman. [address]  [facility]  [facility] [address]  Via Fax: [phone]:      Ms. Anastasiya Keys is a 33 yo F diagnosed with Sjogrens and Hashimoto's followed by rheumatology Dr. Marcelino Chahal, family history of early coronary artery disease, gastroesophageal reflux, borderline dyslipidemia. Since her last visit, she did have a cholecystectomy beginning in January. These past few weeks, she has been having increased palpitations occurring several times a week, lasting seconds to minutes at a time. She also thinks post surgery when she saw her rheumatologist they thought she may have a flare of her autoimmune process. She has had episodes of anxiety attacks and chest discomfort from that. Her breathing has been okay. She is compensated on exam with clear lungs and no lower extremity edema. Her stress test and echocardiogram last year were normal.    Soc hx. No tobacco.  health care  Fam hx. Dad CAD/MI 46, tobacco use. Assessment and Plan:   1. Palpitations. Will obtain a loop monitor to evaluate for possible arrhythmia. 2. Family history of early coronary artery disease. 3. Gastroesophageal reflux disease. 4. Cholecystectomy in 01/2023.   5. Sjogren's, Hashimoto's. Followed by rheumatology, Dr. Marcelino Chahal. 6. Borderline dyslipidemia. If you have questions, please do not hesitate to call me.     Sincerely,      David De Leon MD

## 2023-02-28 NOTE — PROGRESS NOTES
[place]: Juan Manuel Kolb  [phone]    HPI:   Ms. Anastasiya Keys is a 35 yo F diagnosed with Sjogrens and Hashimoto's followed by rheumatology Dr. Marcelino Chahal, family history of early coronary artery disease, gastroesophageal reflux, borderline dyslipidemia. Since her last visit, she did have a cholecystectomy beginning in January. These past few weeks, she has been having increased palpitations occurring several times a week, lasting seconds to minutes at a time. She also thinks post surgery when she saw her rheumatologist they thought she may have a flare of her autoimmune process. She has had episodes of anxiety attacks and chest discomfort from that. Her breathing has been okay. She is compensated on exam with clear lungs and no lower extremity edema. Her stress test and echocardiogram last year were normal.    Soc hx. No tobacco.  health care  Fam hx. Dad CAD/MI 46, tobacco use. Assessment and Plan:   1. Palpitations. Will obtain a loop monitor to evaluate for possible arrhythmia. 2. Family history of early coronary artery disease. 3. Gastroesophageal reflux disease. 4. Cholecystectomy in 01/2023.   5. Sjogren's, Hashimoto's. Followed by rheumatology, Dr. Marcelino Chahal. 6. Borderline dyslipidemia. She  has a past medical history of Herbert Elvira virus infection (2016), GERD (gastroesophageal reflux disease), Ichthyosis, MRSA (methicillin resistant staph aureus) culture positive, Psychiatric disorder (2015), Psychiatric problem (2015), Sjogren's disease ([facility] [address] [address] Syncope, and Thyroid disease.     She has no past medical history of Abnormal Papanicolaou smear of cervix, Anemia, Breast disorder, Chlamydia, Complication of anesthesia, Disease of blood and blood forming organ, Epilepsy ([facility] [address] Essential hypertension, Genital herpes, Gestational diabetes, Gestational hypertension, Gonorrhea, Heart abnormality, Herpes gestationis, Herpes simplex virus (HSV) infection, Human immunodeficiency virus (HIV) disease ([facility] [address] Infertility, female, Kidney disease, Phlebitis and thrombophlebitis, Pituitary disorder ([facility] [facility] Polycystic disease, ovaries, Postpartum depression, Rhesus isoimmunization affecting pregnancy, Sickle cell disease ([facility] [address] Sickle cell trait syndrome [facility] Syphilis, Systemic lupus erythematosus ([facility] Thyroid activity decreased, or Trauma. All other systems negative except as above. PE  Vitals:    02/28/23 0841   BP: 100/60   Pulse: 91   Resp: 16   SpO2: 99%   Weight: 162 lb (73.5 kg)   Height: 5' 8\" (1.727 m)      Body mass index is 24.63 kg/m².   General appearance - alert, well appearing, and in no distress  Mental status - affect appropriate to mood  Eyes - sclera anicteric, moist mucous membranes  Neck - supple, no JVD  Chest - clear to auscultation, no wheezes, rales or rhonchi  Heart - normal rate, regular rhythm, normal S1, S2, no murmurs, rubs, clicks or gallops  Abdomen - soft, nontender, nondistended, no masses or organomegaly  Back exam - full range of motion, no tenderness  Neurological - no focal deficits  Extremities - peripheral pulses normal, no pedal edema      Recent Labs:  No results found for: CHOL, CHOLX, CHLST, CHOLV, 550266, HDL, HDLP, LDL, LDLC, DLDLP, TGLX, TRIGL, TRIGP, CHHD, CHHDX  Lab Results   Component Value Date/Time    Creatinine 0.88 12/24/2022 10:14 PM     Lab Results   Component Value Date/Time    BUN 13 12/24/2022 10:14 PM     Lab Results   Component Value Date/Time    Potassium 3.2 (L) 12/24/2022 10:14 PM     No results found for: HBA1C, AXX4UHPL, IMQ8YKBT  Lab Results   Component Value Date/Time    HGB 13.6 12/24/2022 10:14 PM     Lab Results   Component Value Date/Time    PLATELET 046 95/56/9202 10:14 PM       Reviewed:  Past Medical History:   Diagnosis Date    Rudi Coffey virus infection 2016    TREATED    GERD (gastroesophageal reflux disease)     Ichthyosis     MRSA (methicillin resistant staph aureus) culture positive     NEG AS OF 2017 IN LABS Psychiatric disorder 2015    ANXIETY    Psychiatric problem 2015    anxiety - not currently medicated    Sjogren's disease ([facility] [address]     Syncope     Thyroid disease     MARKERS FOR SECONDART HOSHIMOTOS     Social History     Tobacco Use   Smoking Status Never    Passive exposure: Current (NOT REGULARLY)   Smokeless Tobacco Never     Social History     Substance and Sexual Activity   Alcohol Use Not Currently     Allergies   Allergen Reactions    Bactrim [Sulfamethoprim] Rash       Current Outpatient Medications   Medication Sig    methocarbamoL (ROBAXIN) 500 mg tablet 1 tablet    magnesium 250 mg tab Take  by mouth daily. b complex vitamins tablet Take 1 Tablet by mouth daily. Lactobacillus acidophilus (PROBIOTIC PO) Take 1 Tablet by mouth two (2) times a day. famotidine (PEPCID) 20 mg tablet Take 20 mg by mouth two (2) times a day. promethazine (PHENERGAN) 25 mg tablet Take 1 Tablet by mouth every six (6) hours as needed for Nausea. hydrOXYchloroQUINE (PLAQUENIL) 200 mg tablet Take 200 mg by mouth two (2) times a day. famotidine (PEPCID) 20 mg/50 mL  (Patient not taking: Reported on 2/28/2023)    DULoxetine (CYMBALTA) 30 mg capsule Take 30 mg by mouth daily. NOT TAKING YET BUT PLANS TO START THIS WEEK    polyethylene glycol (MIRALAX) 17 gram packet Take 1 Packet by mouth daily. May take 1-2 packets as needed daily for constipation. (Patient not taking: No sig reported)     No current facility-administered medications for this visit.        Pattie Osborne MD  [facility] [place]  [address] [facility] [address] [place] [address]

## 2023-02-28 NOTE — PROGRESS NOTES
Records received from [facility] and per updated 2/13/23 office protocal, do not meet criteria for further review, schedule next available
No

## 2023-02-28 NOTE — TELEPHONE ENCOUNTER
Enrolled with [employer] - Ordered and being shipped to patient's home address on file. ETA within 5-7 business days. Message  Received: Today  Gina Diggs, SHARMIN Jimenez; Libby Wetzel  Please send 30day loop for palpitations per Dr. Sofia Lauren, thanks!

## 2023-02-28 NOTE — PATIENT INSTRUCTIONS
A monitor has been ordered for you. This will be mailed to the address given to us from [employer]. If you have any insurance questions regarding coverage and out of pocket cost please contact the number below.       If you have any billing questions regarding deductible or responsibility call ([phone])   If you need additional supplies or issue with your monitor please call ([phone])

## 2023-03-13 ENCOUNTER — TELEPHONE (OUTPATIENT)
Dept: CARDIOLOGY CLINIC | Age: 33
End: 2023-03-13

## 2023-03-13 NOTE — TELEPHONE ENCOUNTER
Received serious alert from monitor.  bpm on 3-11-23. Attempted to reach patient by telephone. A message was left for return call.

## 2023-03-15 ENCOUNTER — DOCUMENTATION ONLY (OUTPATIENT)
Dept: CARDIOLOGY CLINIC | Age: 33
End: 2023-03-15

## 2023-03-15 RX ORDER — METOPROLOL SUCCINATE 25 MG/1
25 TABLET, EXTENDED RELEASE ORAL DAILY
Qty: 90 TABLET | Refills: 1 | Status: SHIPPED | OUTPATIENT
Start: 2023-03-15

## 2023-03-15 NOTE — PROGRESS NOTES
Appt is made for SVT    Future Appointments   Date Time Provider Raymond Cheri   3/23/2023  2:00 PM DO DASH Herrera BS AMB   5/10/2023  1:40 PM MD NAOMI Palacio BS AMB   9/8/2023  3:30 PM MD PETRA Kunz BS AMB

## 2023-03-15 NOTE — TELEPHONE ENCOUNTER
Identifiers x 2. Informed of Dr. Sultana Richards recommendation. She will start taking blood pressure at home and notify our office of any concern. Discussed that our office will call if earlier appt with Dr. Jan Doyle available. Verbalized understanding. Requested Prescriptions     Signed Prescriptions Disp Refills    metoprolol succinate (TOPROL-XL) 25 mg XL tablet 90 Tablet 1     Sig: Take 1 Tablet by mouth daily.      Authorizing Provider: Lore King     Ordering User: Brianne Mercado     Written order per Dr. Dorian Peguero.     Future Appointments   Date Time Provider Raymond Alonzo   3/23/2023  2:00 PM DO DASH Grissom BS AMB   6/1/2023  1:40 PM MD PIO Tolentino BS AMB   9/8/2023  3:30 PM MD PETRA Trent BS AMB

## 2023-03-15 NOTE — TELEPHONE ENCOUNTER
----- Message from Lopez Gutierrez MD sent at 3/14/2023 11:56 PM EDT -----  Please let pt know her monitor showed prolonged episode of fast heart beats (SVT). Recommend toprol 25 mg once daily but I'm not sure if she will be able to tolerate this given her low baseline BP. I do think she needs to see our electrical specialist to review options for treatment going forward. May need ablation. Lizbeth Pete can you review her monitor and set up an appt within next 1-2 months for her SVT (went 170-180 bpm).  thx

## 2023-03-23 ENCOUNTER — TELEPHONE (OUTPATIENT)
Dept: NEUROLOGY | Age: 33
End: 2023-03-23

## 2023-03-23 ENCOUNTER — TELEPHONE (OUTPATIENT)
Dept: CARDIOLOGY CLINIC | Age: 33
End: 2023-03-23

## 2023-03-23 ENCOUNTER — OFFICE VISIT (OUTPATIENT)
Dept: NEUROLOGY | Age: 33
End: 2023-03-23
Payer: COMMERCIAL

## 2023-03-23 VITALS
DIASTOLIC BLOOD PRESSURE: 62 MMHG | WEIGHT: 165 LBS | BODY MASS INDEX: 25.01 KG/M2 | HEIGHT: 68 IN | OXYGEN SATURATION: 99 % | SYSTOLIC BLOOD PRESSURE: 102 MMHG | HEART RATE: 89 BPM | RESPIRATION RATE: 16 BRPM

## 2023-03-23 DIAGNOSIS — R42 POSTURAL DIZZINESS: ICD-10-CM

## 2023-03-23 DIAGNOSIS — R20.0 NUMBNESS IN FEET: ICD-10-CM

## 2023-03-23 DIAGNOSIS — R00.2 PALPITATIONS: ICD-10-CM

## 2023-03-23 DIAGNOSIS — G25.2 POSTURAL TREMOR: ICD-10-CM

## 2023-03-23 DIAGNOSIS — G43.109 MIGRAINE WITH AURA AND WITHOUT STATUS MIGRAINOSUS, NOT INTRACTABLE: Primary | ICD-10-CM

## 2023-03-23 PROCEDURE — 99205 OFFICE O/P NEW HI 60 MIN: CPT | Performed by: PSYCHIATRY & NEUROLOGY

## 2023-03-23 RX ORDER — CELECOXIB 100 MG/1
100 CAPSULE ORAL 2 TIMES DAILY
COMMUNITY
Start: 2023-03-02

## 2023-03-23 NOTE — PROGRESS NOTES
NEUROLOGY  NEW PATIENT EVALUATION/CONSULTATION       PATIENT NAME: Dc Dow    MRN: [id number]    REASON FOR CONSULTATION: Headaches    03/23/23      Previous records (physician notes, laboratory reports, and radiology reports) and imaging studies were reviewed and summarized. My recommendations will be communicated back to the patient's physician(s) via electronic medical record and/or by [facility] [facility] mail. HISTORY OF PRESENT ILLNESS:  Dc Dow is a 34 y.o.  female presenting for evaluation of headaches. She reports a h/o headaches x 10 years. Location: R or L frontal region  Character: Throbbing  Frequency: Sporadic 2x yearly  Duration: Few hours  Aura: Yes, visual aura  Associated Sx with HA: +nausea/vomiting, +phonophotophobia. Neurological ROS: Occasional LUE weakness/numbness with headaches. H/O Sjogren's/fibromyalgia. Reports chronic imbalance which is intermittent (no falls), occasional numbness hands/feet b/l (neg prior EMG), R hand tremor (postural), palpitations, postural dizziness  Systemic ROS:   H/O Head trauma: None  Depressive or anxiety Sx: +Anxiety     Any change in pattern of HA? None    Triggers: Unknown.  Non-positional.   Alleviating factors: Rest/sleep  FHx HA/migraine: None    Treatment so far: Ibuprofen PRN  Prior rescue therapy: Shruti [place] PRN-not effective    Investigations so far:   MRI Brain [facility] 12/2020 normal per patient      PAST MEDICAL HISTORY:  Past Medical History:   Diagnosis Date    Star Chirag virus infection 2016    TREATED    GERD (gastroesophageal reflux disease)     Ichthyosis     MRSA (methicillin resistant staph aureus) culture positive     NEG AS OF 2017 IN LABS    Psychiatric disorder 2015    ANXIETY    Psychiatric problem 2015    anxiety - not currently medicated    Sjogren's disease ([facility] [address]     Syncope     Thyroid disease     MARKERS FOR SECONDART HOSHIMOTOS       PAST SURGICAL HISTORY:  Past Surgical History:   Procedure Laterality Date    HX  SECTION  2017    HX  SECTION  2021    HX DILATION AND CURETTAGE  2019    HX OTHER SURGICAL  2008    wisdom teeth removed       FAMILY HISTORY:   Family History   Problem Relation Age of Onset    No Known Problems Mother     Hypertension Father     Diabetes Father     No Known Problems Sister     No Known Problems Son     No Known Problems Daughter     Anesth Problems Neg Hx          SOCIAL HISTORY:  Social History     Socioeconomic History    Marital status:    Tobacco Use    Smoking status: Never     Passive exposure: Current (NOT REGULARLY)    Smokeless tobacco: Never   Vaping Use    Vaping Use: Never used   Substance and Sexual Activity    Alcohol use: Not Currently    Drug use: No    Sexual activity: Yes     Partners: Male     Birth control/protection: None         MEDICATIONS:   Current Outpatient Medications   Medication Sig Dispense Refill    celecoxib (CELEBREX) 100 mg capsule Take 100 mg by mouth two (2) times a day. metoprolol succinate (TOPROL-XL) 25 mg XL tablet Take 1 Tablet by mouth daily. 90 Tablet 1    methocarbamoL (ROBAXIN) 500 mg tablet 1 tablet      DULoxetine (CYMBALTA) 30 mg capsule Take 30 mg by mouth daily. NOT TAKING YET BUT PLANS TO START THIS WEEK      magnesium 250 mg tab Take  by mouth daily. b complex vitamins tablet Take 1 Tablet by mouth daily. Lactobacillus acidophilus (PROBIOTIC PO) Take 1 Tablet by mouth two (2) times a day. famotidine (PEPCID) 20 mg tablet Take 20 mg by mouth two (2) times a day.      hydrOXYchloroQUINE (PLAQUENIL) 200 mg tablet Take 200 mg by mouth two (2) times a day. ALLERGIES:  Allergies   Allergen Reactions    Bactrim [Sulfamethoprim] Rash    Sulfamethoxazole-Trimethoprim Rash         REVIEW OF SYSTEMS:  10 point ROS reviewed with patient. Please see scanned document under media.        PHYSICAL EXAM:  Vital Signs: Visit Vitals  /62 (BP 1 Location: Left upper arm, BP Patient Position: Sitting)   Pulse 89   Resp 16   Ht 5' 8\" (1.727 m)   Wt 165 lb (74.8 kg)   SpO2 99%   BMI 25.09 kg/m²        General Medical Exam:  General:  Well appearing, comfortable, in no apparent distress. Eyes/ENT: see cranial nerve examination. Neck: No masses appreciated. Full range of motion without tenderness. Respiratory:  Clear to auscultation, good air entry bilaterally. Cardiac:  Regular rate and rhythm, no murmur. GI:  Soft, non-tender, non-distended abdomen. Bowel sounds normal. No masses, organomegaly. Extremities:  No deformities, edema, or skin discoloration. Skin:  No rashes or lesions. Neurological:  Mental Status:  Alert and oriented to person, place, and time with fluent speech. Cranial Nerves:   CNII/III/IV/VI: Optic disc w/clear margins b/l, visual fields full to confrontation, EOMI, PERRL, no ptosis or nystagmus. CN V: Facial sensation intact bilaterally, masseter 5/5   CN VII: Facial muscles symmetric and strong   CN VIII: Hears finger rub well bilaterally, intact vestibular function   CN IX/X: Normal palatal movement   CN XI: Full strength shoulder shrug bilaterally   CN XII: Tongue protrusion full and midline without fasciculation or atrophy  Motor: Normal tone and muscle bulk with no pronator drift. No atrophy or fasciculations present on examination.   Individual muscle group testing:  Shoulder abduction:   Left:5/5   Right : 5/5    Shoulder adduction:   Left:5/5   Right : 5/5    Elbow flexion:      Left:5/5   Right : 5/5  Elbow extension:    Left:5/5   Right : 5/5   Wrist flexion:    Left:5/5   Right : 5/5  Wrist extension:    Left:5/5   Right : 5/5  Arm pronation:   Left:5/5   Right : 5/5  Arm supination:   Left:5/5   Right : 5/5    Finger flexion:    Left:5/5   Right : 5/5    Finger extension:   Left:5/5   Right : 5/5   Finger abduction:  Left:5/5   Right : 5/5   Finger adduction:   Left:5/5   Right : 5/5  Hip flexion:     Left:5/5   Right : 5/5         Hip extension:   Left:5/5 Right : 5/5    Knee flexion:    Left:5/5   Right : 5/5    Knee extension:   Left:5/5   Right : 5/5    Dorsiflexion:     Left:5/5   Right : 5/5  Plantar flexion:    Left:5/5   Right : 5/5      MSRs: No crossed adductors or clonus. RIGHT  LEFT   Brachioradialis 1+ 1+   Biceps 1+ 1+   Triceps 1+ 1+   Knee 1+ 1+   Achilles trace 1+        Plantar response Downward Downward          Sensation: Normal and symmetric perception of pinprick, temperature, light touch, proprioception, and vibration  Coordination: No dysmetria. Normal rapid alternating movements; finger-to-nose and heel-to- shin testing are within normal limits. R postural hand tremor (variable). Gait: Normal native gait. ASSESSMENT:      ICD-10-CM ICD-9-CM    1. Migraine with aura and without status migrainosus, not intractable  G43.109 346.00       2. Numbness in feet  R20.0 782.0 HEMOGLOBIN A1C WITH EAG      TSH 3RD GENERATION      T3, FREE      T4, FREE      VITAMIN B12      REFERRAL TO NEUROLOGY      HEMOGLOBIN A1C WITH EAG      TSH 3RD GENERATION      T3, FREE      T4, FREE      VITAMIN B12      3. Postural tremor  G25.2 333.1 TSH 3RD GENERATION      T3, FREE      T4, FREE      TSH 3RD GENERATION      T3, FREE      T4, FREE      4. Postural dizziness  R42 780.4 REFERRAL TO NEUROLOGY      5. Palpitations  R00.2 785.1 TSH 3RD GENERATION      T3, FREE      T4, FREE      REFERRAL TO NEUROLOGY      TSH 3RD GENERATION      T3, FREE      T4, FREE      34year old female with a h/o Sjogren's syndrome, fibromyalgia, anxiety presenting for a constellation of symptoms including sporadic migraines w/aura x 10 years, intermittent distal extremity paresthesias, concerns regarding right hand tremor as well as POTs due to postural dizziness/palpitations. Migraine w/aura- well controlled presently and unchanged in character. Will use Nurtec 75mg PRN for migraine rescue therapy given prior failures with Ubrelvy.  Obtain prior [facility] for independent review. Intermittent numbness distal hands/feet-prior EMG completed and without evidence of polyneuropathy or lumbosacral/cervical radiculopathy. Will proceed with skin biopsy for small fiber neuropathy assessment and complete neuropathy laboratory investigations to exclude reversible metabolic derangements. R hand postural tremor- this appears benign and likely associated with enhanced physiologic tremor exacerbated by underlying anxiety. She was reassured there is no evidence of Parkinson's disease or other neurodegenerative process. Will check thyroid studies to exclude any contributing thyroid abnormalities. Postural dizziness/palpitations- Borderline positive orthostatic vitals in office based on diastolic measurements. Will proceed with formal autonomic testing for evaluation of postural orthostatic tachycardia syndrome. PLAN:  HbA1C, TSH, free T3/4, B12  Autonomic testing  Skin biopsy for distal extremity paresthesias  Trial of Nurtec 75mg PRN for migraine rescue therapy      Follow-up and Dispositions    Return in about 3 months (around 6/23/2023). I have discussed the diagnosis with the patient today and the intended plan as seen in the above orders with both the patient as well as referring provider and/or PCP via electronic correspondence. The patient has received an after-visit summary and questions were answered concerning future plans. I have discussed medication side effects and warnings with the patient as well. Narda Kearney DO  Staff Neurologist  Diplomate, [address] [place] [address] Board of Psychiatry & Neurology

## 2023-03-23 NOTE — PROGRESS NOTES
Chief Complaint   Patient presents with    New Patient     Migraines Patient reports it started 10 years ago. Typically starts with an aura. Last year she has had about 2 in total. This year she hasn't had her migraine. Hand tremors: Reports hand tremors that started in August.   Jerk movements: Reports leg and arm jerk movements that started years ago. Balance issues: Reports she loses her balance at times. She noticed it a year or two ago.    POTS: Patient reports she was seen by her cardiologist. Cardiologist wanted this checked out by the neurologist.      Visit Vitals  /62 (BP 1 Location: Left upper arm, BP Patient Position: Sitting)   Pulse 89   Resp 16   Ht 5' 8\" (1.727 m)   Wt 165 lb (74.8 kg)   SpO2 99%   BMI 25.09 kg/m²

## 2023-03-23 NOTE — LETTER
3/23/2023    Patient: Brent Magaña   YOB: 1990   Date of Visit: 3/23/2023     Glenn Townsend, [address] [facility] [address] [address]  Via Fax: [phone]    Dear BESSY Castro,      Thank you for referring Ms. Brent Magaña to [address] [place] [address] for evaluation. My notes for this consultation are attached. If you have questions, please do not hesitate to call me. I look forward to following your patient along with you.       Sincerely,    Christopher Singh, DO

## 2023-04-04 LAB
EST. AVERAGE GLUCOSE BLD GHB EST-MCNC: 85 MG/DL
HBA1C MFR BLD: 4.6 % (ref 4.8–5.6)
T3FREE SERPL-MCNC: 3 PG/ML (ref 2–4.4)
T4 FREE SERPL-MCNC: 1.02 NG/DL (ref 0.82–1.77)
TSH SERPL DL<=0.005 MIU/L-ACNC: 1.67 UIU/ML (ref 0.45–4.5)
VIT B12 SERPL-MCNC: 905 PG/ML (ref 232–1245)

## 2023-04-05 ENCOUNTER — TELEPHONE (OUTPATIENT)
Dept: NEUROLOGY | Age: 33
End: 2023-04-05

## 2023-04-05 NOTE — TELEPHONE ENCOUNTER
Called patient. Informed her that the doctor reviewed her labs stating, \"Labs reviewed without significant abnormalities. \" Patient verbalizes understanding.

## 2023-04-06 ENCOUNTER — OFFICE VISIT (OUTPATIENT)
Dept: NEUROLOGY | Age: 33
End: 2023-04-06

## 2023-04-06 NOTE — PROCEDURES
[facility]  [facility] [address] [facility] [address] [place], [address] [facility]  Phone: [phone]  FAX: [phone]     Clinical Autonomic Testing Report     Patient ID:  Lashay Galindo  [id number]  23 y.o.  1990     REFERRED BY: Igncaio Durbin MD  PCP: BESSY Love    Date of Testin2023       Indication/History:    Since , patient noted dizziness, unbalanced feeling, increased heart rate  (+) Sjogren syndrome    Patient is coming for syncope/autonomic dysfunction evaluation. Medications taken 48 hrs before the test: Hydroxychloroquine, Duloxetine, Celebrex, Methacarabmol     Procedure: Insurance only approved Head-Up Tilt Table Testing. It is performed by utilizing [address] [place] [address] [employer] Autonomic System, with established protocol. Result:HUT (head-up tilt) : [id number] BP and HR were measured, up to 15 minutes post tilt. There is an exaggerated sustained increase in heart rate from baseline HR of 72 bpm to max HR of 131 bpm at 8 mins post tilt associated with lightheadedness, without accompanying hypotension. Impression:   ABNORMAL    There is an exaggerated sustained increase in heart rate (greater than 30 beats/min, increasing to 120 beats/min or greater) without accompanying hypotension within the first 10 mins of tilt table testing which can be seen in postural orthostatic tachycardia syndrome (POTS).          Sadie Pulliam MD  Diplomate, American Board of Psychiatry and Neurology  Diplomate, Neuromuscular Medicine  Diplomate, American Board of Electrodiagnostic Medicine    Note: Raw Data will be scanned separately in Media

## 2023-04-25 ENCOUNTER — TELEPHONE (OUTPATIENT)
Dept: NEUROLOGY | Age: 33
End: 2023-04-25

## 2023-05-08 ENCOUNTER — TELEPHONE (OUTPATIENT)
Dept: NEUROLOGY | Age: 33
End: 2023-05-08

## 2023-05-08 ENCOUNTER — TELEPHONE (OUTPATIENT)
Age: 33
End: 2023-05-08

## 2023-05-08 NOTE — TELEPHONE ENCOUNTER
Re: Jacinto Morin case in [facility] key# [id number], submitted and [facility] message back that 8 per 30 is available without authorization. Update sent to nurse.

## 2023-05-10 ENCOUNTER — OFFICE VISIT (OUTPATIENT)
Age: 33
End: 2023-05-10
Payer: COMMERCIAL

## 2023-05-10 VITALS
OXYGEN SATURATION: 99 % | HEART RATE: 96 BPM | BODY MASS INDEX: 25.07 KG/M2 | SYSTOLIC BLOOD PRESSURE: 110 MMHG | RESPIRATION RATE: 16 BRPM | DIASTOLIC BLOOD PRESSURE: 86 MMHG | WEIGHT: 165.4 LBS | HEIGHT: 68 IN

## 2023-05-10 DIAGNOSIS — R00.0 SINUS TACHYCARDIA SEEN ON CARDIAC MONITOR: ICD-10-CM

## 2023-05-10 DIAGNOSIS — R00.2 PALPITATIONS: Primary | ICD-10-CM

## 2023-05-10 DIAGNOSIS — R07.89 OTHER CHEST PAIN: ICD-10-CM

## 2023-05-10 DIAGNOSIS — I48.92 PAROXYSMAL ATRIAL FLUTTER (HCC): ICD-10-CM

## 2023-05-10 PROCEDURE — 99204 OFFICE O/P NEW MOD 45 MIN: CPT | Performed by: INTERNAL MEDICINE

## 2023-05-10 RX ORDER — MULTIVITAMIN WITH IRON
250 TABLET ORAL DAILY
COMMUNITY

## 2023-05-10 RX ORDER — GREEN TEA/HOODIA GORDONII 315-12.5MG
1 CAPSULE ORAL 2 TIMES DAILY
COMMUNITY

## 2023-05-10 RX ORDER — LORATADINE 10 MG/1
10 CAPSULE, LIQUID FILLED ORAL DAILY
COMMUNITY

## 2023-05-10 RX ORDER — ESOMEPRAZOLE MAGNESIUM 20 MG/1
20 FOR SUSPENSION ORAL DAILY
COMMUNITY

## 2023-05-10 RX ORDER — NAPROXEN 375 MG/1
375 TABLET ORAL 2 TIMES DAILY
COMMUNITY
Start: 2023-05-08

## 2023-05-10 NOTE — PROGRESS NOTES
[facility]  Cardiac Electrophysiology Office Care Note                  [x]Initial visit     []Established visit     Patient Name: Deepti DORSEY:[birth year] - BFI:[id number]  Primary Cardiologist: Sheeba Schmidt MD  Consulting Cardiologist: Donald Delgado MD     Reason for initial visit: palpitation    HPI:   34 y. o.woman who is here for possible atrial flutter and sinus tachycardia on event recorder  Loop monitor possible atrial flutter 172 bpm 3/20/23 at 2 38 pm    She has chest pain and palpitation, rapid heart beats  She had chest pain when taking toprol at first  She has Sjogrens and Hashimoto's followed by rheumatology Dr. Tray Lara, family history of early coronary artery disease, gastroesophageal reflux, borderline dyslipidemia. cholecystectomy beginning in January. She saw her rheumatologist they thought she may have a flare of her autoimmune process. Her stress test and echocardiogram last year were normal.      Dr Jackeline Posada referred for treatment of her arrhythmia   Assessment and Plan      Diagnosis Orders   1. Palpitations        2. Paroxysmal atrial flutter (HCC)        3. Sinus tachycardia seen on cardiac monitor        4. Other chest pain            Sinus tachycardia- inappropriate vs POTS. She does not tolerate more toprol  For corlanor, she cannot use in [place] since she does not have low LVEF  She will try to find out with hard copy to different pharmacy  She understands she will need to consider atrial flutter ablation if she is able to buy and use corlanor  She said she will call back    BP is normal             ____________________________________________________________       STRESS TEST ONLY EXERCISE 09/29/2022  2:44 PM (Final)    Narrative  This is a summary report. The complete report is available in the patient's medical record.  If you cannot access the medical record, please contact the sending organization for a

## 2023-05-10 NOTE — PROGRESS NOTES
Room #: 2        Chief Complaint   Patient presents with    Palpitations    Other     SVT       Vitals:    05/10/23 1343   BP: 110/86   Pulse: 96   Resp: 16   SpO2: 99%         Chest pain:  YES  Shortness of breath:  NO  Edema: NO  Palpitations, skipped beats, rapid heartbeat:  YES  Dizziness:  NO    1. Have you been to the ER, urgent care clinic since your last visit? Hospitalized since your last visit? NO    2. Have you seen or consulted any other health care providers outside of the [address] [place] [address] since your last visit? Include any pap smears or colon screening.  NO      Refills:  NO

## 2023-06-01 ENCOUNTER — OFFICE VISIT (OUTPATIENT)
Age: 33
End: 2023-06-01
Payer: COMMERCIAL

## 2023-06-01 VITALS
HEIGHT: 68 IN | SYSTOLIC BLOOD PRESSURE: 100 MMHG | BODY MASS INDEX: 25.01 KG/M2 | TEMPERATURE: 97.4 F | OXYGEN SATURATION: 98 % | DIASTOLIC BLOOD PRESSURE: 78 MMHG | WEIGHT: 165 LBS | HEART RATE: 84 BPM

## 2023-06-01 DIAGNOSIS — M35.00 PRIMARY SJOGREN'S SYNDROME (HCC): ICD-10-CM

## 2023-06-01 DIAGNOSIS — K76.0 FATTY LIVER: Primary | ICD-10-CM

## 2023-06-01 DIAGNOSIS — E06.3 HASHIMOTO'S DISEASE: ICD-10-CM

## 2023-06-01 DIAGNOSIS — G90.A POTS (POSTURAL ORTHOSTATIC TACHYCARDIA SYNDROME): ICD-10-CM

## 2023-06-01 PROBLEM — I73.00 RAYNAUD DISEASE: Status: ACTIVE | Noted: 2023-06-01

## 2023-06-01 PROCEDURE — 99204 OFFICE O/P NEW MOD 45 MIN: CPT | Performed by: NURSE PRACTITIONER

## 2023-06-01 PROCEDURE — 91200 LIVER ELASTOGRAPHY: CPT | Performed by: NURSE PRACTITIONER

## 2023-06-01 RX ORDER — PILOCARPINE HYDROCHLORIDE 5 MG/1
5 TABLET, FILM COATED ORAL
COMMUNITY
Start: 2023-05-24

## 2023-06-01 ASSESSMENT — PATIENT HEALTH QUESTIONNAIRE - PHQ9
1. LITTLE INTEREST OR PLEASURE IN DOING THINGS: 0
2. FEELING DOWN, DEPRESSED OR HOPELESS: 0
SUM OF ALL RESPONSES TO PHQ QUESTIONS 1-9: 0
SUM OF ALL RESPONSES TO PHQ9 QUESTIONS 1 & 2: 0

## 2023-06-01 NOTE — PROGRESS NOTES
Identified pt with two pt identifiers(name and ). Reviewed record in preparation for visit and have obtained necessary documentation. Chief Complaint   Patient presents with    Other     Establish care. Fatty Liver     /78 (Site: Right Upper Arm, Position: Sitting, Cuff Size: Medium Adult)   Pulse 84   Temp 97.4 °F (36.3 °C) (Temporal)   Ht 5' 8\" (1.727 m)   Wt 165 lb (74.8 kg)   SpO2 98%   BMI 25.09 kg/m²       1. \"Have you been to the ER, urgent care clinic since your last visit? Hospitalized since your last visit? \" No    2. \"Have you seen or consulted any other health care providers outside of the [address] [place] [address] since your last visit? \" No     Patient is accompanied by self I have received verbal consent from Sanjuana Farmer to discuss any/all medical information while they are present in the room.
Please disregard entry
significant hepatic steatosis. ENDOSCOPIC PROCEDURES:  Not available or performed    RADIOLOGY:  2/2023. CT scan abdomen with IV contrast. Changes consistent with fatty liver. No liver mass lesions. Normal spleen. No ascites. OTHER TESTING:  Not available or performed    FOLLOW-UP:  All of the issues listed above in the Assessment and Plan were discussed with the patient. All questions were answered. The patient expressed a clear understanding of the above.     Follow-up Ulysses Moore 32 in 6 months for [address] [facility], [address] [facility] S[address], [address] [facility] Randi Swartz  22.  [phone]  [address] [facility]

## 2023-06-02 ENCOUNTER — TELEPHONE (OUTPATIENT)
Age: 33
End: 2023-06-02

## 2023-06-02 LAB
A1AT SERPL-MCNC: 145 MG/DL (ref 100–188)
ALBUMIN SERPL-MCNC: 4.6 G/DL (ref 3.8–4.8)
ALP SERPL-CCNC: 110 IU/L (ref 44–121)
ALT SERPL-CCNC: 35 IU/L (ref 0–32)
AST SERPL-CCNC: 25 IU/L (ref 0–40)
BASOPHILS # BLD AUTO: 0.1 X10E3/UL (ref 0–0.2)
BASOPHILS NFR BLD AUTO: 1 %
BILIRUB DIRECT SERPL-MCNC: 0.14 MG/DL (ref 0–0.4)
BILIRUB SERPL-MCNC: 0.5 MG/DL (ref 0–1.2)
BUN SERPL-MCNC: 12 MG/DL (ref 6–20)
BUN/CREAT SERPL: 15 (ref 9–23)
CALCIUM SERPL-MCNC: 9.9 MG/DL (ref 8.7–10.2)
CERULOPLASMIN SERPL-MCNC: 22 MG/DL (ref 19–39)
CHLORIDE SERPL-SCNC: 100 MMOL/L (ref 96–106)
CO2 SERPL-SCNC: 25 MMOL/L (ref 20–29)
CREAT SERPL-MCNC: 0.8 MG/DL (ref 0.57–1)
EGFRCR SERPLBLD CKD-EPI 2021: 99 ML/MIN/1.73
EOSINOPHIL # BLD AUTO: 0.1 X10E3/UL (ref 0–0.4)
EOSINOPHIL NFR BLD AUTO: 1 %
ERYTHROCYTE [DISTWIDTH] IN BLOOD BY AUTOMATED COUNT: 12.1 % (ref 11.7–15.4)
FERRITIN SERPL-MCNC: 40 NG/ML (ref 15–150)
GLUCOSE SERPL-MCNC: 89 MG/DL (ref 70–99)
HAV AB SER QL IA: NEGATIVE
HBV CORE AB SERPL QL IA: NEGATIVE
HBV SURFACE AB SER QL: REACTIVE
HBV SURFACE AG SERPL QL IA: NEGATIVE
HCT VFR BLD AUTO: 41.7 % (ref 34–46.6)
HCV AB SERPL QL IA: NORMAL
HCV IGG SERPL QL IA: NON REACTIVE
HGB BLD-MCNC: 14.2 G/DL (ref 11.1–15.9)
IMM GRANULOCYTES # BLD AUTO: 0 X10E3/UL (ref 0–0.1)
IMM GRANULOCYTES NFR BLD AUTO: 0 %
IRON SATN MFR SERPL: 35 % (ref 15–55)
IRON SERPL-MCNC: 117 UG/DL (ref 27–159)
LYMPHOCYTES # BLD AUTO: 1.9 X10E3/UL (ref 0.7–3.1)
LYMPHOCYTES NFR BLD AUTO: 26 %
MCH RBC QN AUTO: 30.1 PG (ref 26.6–33)
MCHC RBC AUTO-ENTMCNC: 34.1 G/DL (ref 31.5–35.7)
MCV RBC AUTO: 88 FL (ref 79–97)
MONOCYTES # BLD AUTO: 0.6 X10E3/UL (ref 0.1–0.9)
MONOCYTES NFR BLD AUTO: 7 %
NEUTROPHILS # BLD AUTO: 4.9 X10E3/UL (ref 1.4–7)
NEUTROPHILS NFR BLD AUTO: 65 %
PLATELET # BLD AUTO: 244 X10E3/UL (ref 150–450)
POTASSIUM SERPL-SCNC: 5 MMOL/L (ref 3.5–5.2)
PROT SERPL-MCNC: 7.4 G/DL (ref 6–8.5)
RBC # BLD AUTO: 4.72 X10E6/UL (ref 3.77–5.28)
SODIUM SERPL-SCNC: 140 MMOL/L (ref 134–144)
TIBC SERPL-MCNC: 337 UG/DL (ref 250–450)
UIBC SERPL-MCNC: 220 UG/DL (ref 131–425)
WBC # BLD AUTO: 7.5 X10E3/UL (ref 3.4–10.8)

## 2023-06-02 NOTE — TELEPHONE ENCOUNTER
Verified patient with two types of identifiers. Patient states she would like to move forward with Atrial Flutter Ablation. Scheduled ablation for 8/25/23. Will send [employer] message with pre procedure instructions. Patient verbalized understanding and will call with any other questions.       Future Appointments   Date Time Provider Abdirahman Bee   8/14/2023  9:00 AM DANIA Elizabeth NP AMB   [facility]  [facility]   9/8/2023  9:00 AM DANIA Elizabeth NP AMB   12/4/2023  8:30 AM DANIA Castaneda NP BS AMB

## 2023-06-02 NOTE — TELEPHONE ENCOUNTER
Pt is calling because she would like to let  per there discussion about an ablation. Pt decided she would like to proceed with this.     [phone]

## 2023-06-04 LAB — MITOCHONDRIA M2 IGG SER-ACNC: <20 UNITS (ref 0–20)

## 2023-06-07 LAB — SMA IGG SER-ACNC: 8 UNITS (ref 0–19)

## 2023-08-08 NOTE — H&P (VIEW-ONLY)
[facility]  Cardiac Electrophysiology Clinic Care Note                  []Initial visit     [x]Established visit     Patient Name: Delilah Cho - MPS:[birth year] - [id number]  Primary Cardiologist: Yulissa Hung MD  Electrophysiologist: Daksha Pradhan MD     Reason for visit: H&P update    HPI:  Ms. Michael Cramer is a 28 y.o. female who presents for H&P update prior to upcoming planned ablation of typical AFL (scheduled for 08/25/2023). She reports intermittent chest pain, palpitations, & rapid beats. Chest discomfort is independent of exertion, is not associated with her palpitations. States stress tends to worsen symptoms. Unable to tolerate higher dose Toprol XL. Loop monitor in 03/22/2023 showed possible  bpm.    Exercise stress test in 09/2022 was unremarkable. Previous:  Sjogrens & Hashimoto's, followed by Dr. De Mata (rheumatology). Family history of early CAD. Assessment and Plan     Typical AFL: Possibly noted via loop monitor in 03/2023. Symptomatic with intermittent chest pain, palpitations, & rapid beats. Unable to tolerate higher dose Toprol XL. Reviewed risks/benefits of EPS & ablation of typical AFL. She agrees to proceed as scheduled. Labs ordered. [id number] 1 (female), not on [facility]. Sinus tachycardia:  Continue Toprol XL. Follow up in EP clinic as noted below. Future Appointments   Date Time Provider [address]   8/16/2023  9:00 AM [facility] ULLOA BS AMB   9/8/2023  9:00 AM DANIA Weiss NP AMB   12/4/2023  8:30 AM Cherl Aschoff, APRN - NP LIVR BS AMB         ____________________________________________________________    Cardiac testing      09/29/22    STRESS TEST ONLY EXERCISE 09/29/2022  2:44 PM (Final)    Narrative  This is a summary report. The complete report is available in the patient's medical record.  If you cannot access the medical record, please contact the sending PA

## 2023-08-08 NOTE — PROGRESS NOTES
[facility]  Cardiac Electrophysiology Clinic Care Note                  []Initial visit     [x]Established visit     Patient Name: Soha Joseph - Z:[id number] - [id number]  Primary Cardiologist: Nancy Ponce MD  Electrophysiologist: Peggy Bonds MD     Reason for visit: H&P update    HPI:  Ms. Isabell George is a 28 y.o. female who presents for H&P update prior to upcoming planned ablation of typical AFL (scheduled for 08/25/2023). She reports intermittent chest pain, palpitations, & rapid beats. Chest discomfort is independent of exertion, is not associated with her palpitations. States stress tends to worsen symptoms. Unable to tolerate higher dose Toprol XL. Loop monitor in 03/22/2023 showed possible  bpm.    Exercise stress test in 09/2022 was unremarkable. Previous:  Sjogrens & Hashimoto's, followed by Dr. Guero James (rheumatology). Family history of early CAD. Assessment and Plan     Typical AFL: Possibly noted via loop monitor in 03/2023. Symptomatic with intermittent chest pain, palpitations, & rapid beats. Unable to tolerate higher dose Toprol XL. Reviewed risks/benefits of EPS & ablation of typical AFL. She agrees to proceed as scheduled. Labs ordered. [id number] 1 (female), not on [address] Sinus tachycardia:  Continue Toprol XL. Follow up in EP clinic as noted below. Future Appointments   Date Time Provider [address]   8/16/2023  9:00 AM [facility] ULLOA BS AMB   9/8/2023  9:00 AM DANIA Moctezuma NP AMB   12/4/2023  8:30 AM DANIA Akins NP BS AMB         ____________________________________________________________    Cardiac testing      09/29/22    STRESS TEST ONLY EXERCISE 09/29/2022  2:44 PM (Final)    Narrative  This is a summary report. The complete report is available in the patient's medical record.  If you cannot access the medical record, please contact the sending

## 2023-08-14 ENCOUNTER — OFFICE VISIT (OUTPATIENT)
Age: 33
End: 2023-08-14
Payer: COMMERCIAL

## 2023-08-14 VITALS
HEART RATE: 97 BPM | WEIGHT: 170 LBS | HEIGHT: 68 IN | OXYGEN SATURATION: 99 % | DIASTOLIC BLOOD PRESSURE: 62 MMHG | SYSTOLIC BLOOD PRESSURE: 104 MMHG | BODY MASS INDEX: 25.76 KG/M2

## 2023-08-14 DIAGNOSIS — I48.3 TYPICAL ATRIAL FLUTTER (HCC): Primary | ICD-10-CM

## 2023-08-14 DIAGNOSIS — R00.0 SINUS TACHYCARDIA: ICD-10-CM

## 2023-08-14 DIAGNOSIS — I48.3 TYPICAL ATRIAL FLUTTER (HCC): ICD-10-CM

## 2023-08-14 LAB
ANION GAP SERPL CALC-SCNC: 2 MMOL/L (ref 5–15)
BASOPHILS # BLD: 0.1 K/UL (ref 0–0.1)
BASOPHILS NFR BLD: 1 % (ref 0–1)
BUN SERPL-MCNC: 12 MG/DL (ref 6–20)
BUN/CREAT SERPL: 16 (ref 12–20)
CALCIUM SERPL-MCNC: 9.4 MG/DL (ref 8.5–10.1)
CHLORIDE SERPL-SCNC: 107 MMOL/L (ref 97–108)
CO2 SERPL-SCNC: 30 MMOL/L (ref 21–32)
CREAT SERPL-MCNC: 0.73 MG/DL (ref 0.55–1.02)
DIFFERENTIAL METHOD BLD: NORMAL
EOSINOPHIL # BLD: 0.1 K/UL (ref 0–0.4)
EOSINOPHIL NFR BLD: 1 % (ref 0–7)
ERYTHROCYTE [DISTWIDTH] IN BLOOD BY AUTOMATED COUNT: 12.2 % (ref 11.5–14.5)
GLUCOSE SERPL-MCNC: 76 MG/DL (ref 65–100)
HCT VFR BLD AUTO: 40.7 % (ref 35–47)
HGB BLD-MCNC: 13.5 G/DL (ref 11.5–16)
IMM GRANULOCYTES # BLD AUTO: 0 K/UL (ref 0–0.04)
IMM GRANULOCYTES NFR BLD AUTO: 0 % (ref 0–0.5)
LYMPHOCYTES # BLD: 1.6 K/UL (ref 0.8–3.5)
LYMPHOCYTES NFR BLD: 20 % (ref 12–49)
MCH RBC QN AUTO: 29.9 PG (ref 26–34)
MCHC RBC AUTO-ENTMCNC: 33.2 G/DL (ref 30–36.5)
MCV RBC AUTO: 90.2 FL (ref 80–99)
MONOCYTES # BLD: 0.7 K/UL (ref 0–1)
MONOCYTES NFR BLD: 9 % (ref 5–13)
NEUTS SEG # BLD: 5.4 K/UL (ref 1.8–8)
NEUTS SEG NFR BLD: 69 % (ref 32–75)
NRBC # BLD: 0 K/UL (ref 0–0.01)
NRBC BLD-RTO: 0 PER 100 WBC
PLATELET # BLD AUTO: 256 K/UL (ref 150–400)
PMV BLD AUTO: 11.7 FL (ref 8.9–12.9)
POTASSIUM SERPL-SCNC: 4.8 MMOL/L (ref 3.5–5.1)
RBC # BLD AUTO: 4.51 M/UL (ref 3.8–5.2)
SODIUM SERPL-SCNC: 139 MMOL/L (ref 136–145)
WBC # BLD AUTO: 7.8 K/UL (ref 3.6–11)

## 2023-08-14 PROCEDURE — 99214 OFFICE O/P EST MOD 30 MIN: CPT | Performed by: NURSE PRACTITIONER

## 2023-08-14 RX ORDER — CEVIMELINE HYDROCHLORIDE 30 MG/1
30 CAPSULE ORAL 2 TIMES DAILY
COMMUNITY
Start: 2023-08-02

## 2023-08-14 NOTE — PATIENT INSTRUCTIONS
Your EP Study and Atrial Flutter Ablation procedure has been scheduled for 8/25/23 at 1:00 pm, at [facility].     Please report to Admitting Department by 11:00 am, or 2 hours prior to your scheduled procedure. Please bring a list of your current medications and medication bottles, if able, to the hospital on this day. You will be unable to drive after your procedure so please make sure to bring someone with you to your procedure. You will need to have nothing to eat or drink after midnight, the night prior to your procedure. You may have small sips of water, if needed, to take with your medication. You will need labs drawn prior to your procedure. Please go to have this done today. You should NOT stop your medication prior to your scheduled procedure. After your procedure, you will need to follow up with Kristi Escoto NP.  Your follow-up appointment has been scheduled for 9/8/23 at 9:00 am.

## 2023-08-15 ENCOUNTER — PREP FOR PROCEDURE (OUTPATIENT)
Age: 33
End: 2023-08-15

## 2023-08-15 LAB
ALBUMIN SERPL-MCNC: 4.1 G/DL (ref 3.5–5)
ALBUMIN/GLOB SERPL: 1.5 (ref 1.1–2.2)
ALP SERPL-CCNC: 76 U/L (ref 45–117)
ALT SERPL-CCNC: 32 U/L (ref 12–78)
ANION GAP SERPL CALC-SCNC: 5 MMOL/L (ref 5–15)
AST SERPL-CCNC: 19 U/L (ref 15–37)
BILIRUB SERPL-MCNC: 1.7 MG/DL (ref 0.2–1)
BUN SERPL-MCNC: 14 MG/DL (ref 6–20)
BUN/CREAT SERPL: 14 (ref 12–20)
CALCIUM SERPL-MCNC: 9.4 MG/DL (ref 8.5–10.1)
CHLORIDE SERPL-SCNC: 104 MMOL/L (ref 97–108)
CHOLEST SERPL-MCNC: 208 MG/DL
CK SERPL-CCNC: 186 U/L (ref 26–192)
CO2 SERPL-SCNC: 31 MMOL/L (ref 21–32)
CREAT SERPL-MCNC: 1.01 MG/DL (ref 0.55–1.02)
GLOBULIN SER CALC-MCNC: 2.7 G/DL (ref 2–4)
GLUCOSE SERPL-MCNC: 102 MG/DL (ref 65–100)
HDLC SERPL-MCNC: 48 MG/DL
HDLC SERPL: 4.3 (ref 0–5)
LDLC SERPL CALC-MCNC: 118.8 MG/DL (ref 0–100)
POTASSIUM SERPL-SCNC: 4.5 MMOL/L (ref 3.5–5.1)
PROT SERPL-MCNC: 6.8 G/DL (ref 6.4–8.2)
SODIUM SERPL-SCNC: 140 MMOL/L (ref 136–145)
TRIGL SERPL-MCNC: 206 MG/DL
VLDLC SERPL CALC-MCNC: 41.2 MG/DL

## 2023-08-15 RX ORDER — SODIUM CHLORIDE 0.9 % (FLUSH) 0.9 %
5-40 SYRINGE (ML) INJECTION EVERY 12 HOURS SCHEDULED
Status: CANCELLED | OUTPATIENT
Start: 2023-08-16

## 2023-08-15 RX ORDER — SODIUM CHLORIDE 0.9 % (FLUSH) 0.9 %
5-40 SYRINGE (ML) INJECTION PRN
Status: CANCELLED | OUTPATIENT
Start: 2023-08-15

## 2023-08-15 RX ORDER — SODIUM CHLORIDE 9 MG/ML
INJECTION, SOLUTION INTRAVENOUS PRN
Status: CANCELLED | OUTPATIENT
Start: 2023-08-15

## 2023-08-16 ENCOUNTER — OFFICE VISIT (OUTPATIENT)
Age: 33
End: 2023-08-16
Payer: COMMERCIAL

## 2023-08-16 ENCOUNTER — CLINICAL DOCUMENTATION (OUTPATIENT)
Age: 33
End: 2023-08-16

## 2023-08-16 VITALS
HEIGHT: 68 IN | SYSTOLIC BLOOD PRESSURE: 118 MMHG | WEIGHT: 170 LBS | OXYGEN SATURATION: 99 % | HEART RATE: 105 BPM | RESPIRATION RATE: 16 BRPM | BODY MASS INDEX: 25.76 KG/M2 | DIASTOLIC BLOOD PRESSURE: 64 MMHG

## 2023-08-16 DIAGNOSIS — G90.A POTS (POSTURAL ORTHOSTATIC TACHYCARDIA SYNDROME): ICD-10-CM

## 2023-08-16 DIAGNOSIS — G43.109 MIGRAINE WITH AURA, NOT INTRACTABLE, WITHOUT STATUS MIGRAINOSUS: Primary | ICD-10-CM

## 2023-08-16 PROCEDURE — 99214 OFFICE O/P EST MOD 30 MIN: CPT | Performed by: PSYCHIATRY & NEUROLOGY

## 2023-08-16 RX ORDER — RIMEGEPANT SULFATE 75 MG/75MG
TABLET, ORALLY DISINTEGRATING ORAL PRN
COMMUNITY

## 2023-08-16 ASSESSMENT — PATIENT HEALTH QUESTIONNAIRE - PHQ9
1. LITTLE INTEREST OR PLEASURE IN DOING THINGS: 0
SUM OF ALL RESPONSES TO PHQ QUESTIONS 1-9: 0
SUM OF ALL RESPONSES TO PHQ9 QUESTIONS 1 & 2: 0
SUM OF ALL RESPONSES TO PHQ QUESTIONS 1-9: 0
2. FEELING DOWN, DEPRESSED OR HOPELESS: 0
SUM OF ALL RESPONSES TO PHQ QUESTIONS 1-9: 0
SUM OF ALL RESPONSES TO PHQ QUESTIONS 1-9: 0

## 2023-08-16 NOTE — PROGRESS NOTES
06/01/2023 4.72     Hemoglobin 06/01/2023 14.2     Hematocrit 06/01/2023 41.7     MCV 06/01/2023 88     MCH 06/01/2023 30.1     MCHC 06/01/2023 34.1     RDW 06/01/2023 12.1     Platelets 02/35/4402 244     Neutrophils % 06/01/2023 65     Lymphocytes % 06/01/2023 26     Monocytes % 06/01/2023 7     Eosinophils % 06/01/2023 1     Basophils % 06/01/2023 1     Neutrophils Absolute 06/01/2023 4.9     Lymphocytes Absolute 06/01/2023 1.9     Monocytes Absolute 06/01/2023 0.6     Eosinophils Absolute 06/01/2023 0.1     Basophils Absolute 06/01/2023 0.1     Immature Granulocytes 06/01/2023 0     Immature Grans (Abs) 06/01/2023 0.0     Glucose 06/01/2023 89     BUN 06/01/2023 12     Creatinine 06/01/2023 0.80     Est, Glomerular Filtrati* 06/01/2023 99     BUN/Creatinine Ratio 06/01/2023 15     Sodium 06/01/2023 140     Potassium 06/01/2023 5.0     Chloride 06/01/2023 100     CO2 06/01/2023 25     Calcium 06/01/2023 9.9     Total Protein 06/01/2023 7.4     Albumin 06/01/2023 4.6     Total Bilirubin 06/01/2023 0.5     Bilirubin, Direct 06/01/2023 0.14     Alkaline Phosphatase 06/01/2023 110     AST 06/01/2023 25     ALT 06/01/2023 35 (H)     TIBC 06/01/2023 337     UIBC 06/01/2023 220     Iron 06/01/2023 117     Iron Saturation 06/01/2023 35     Hepatitis C Ab 06/01/2023 Non Reactive     Interpretation 06/01/2023 Comment     Hep B S Ab 06/01/2023 Reactive     Ceruloplasmin 06/01/2023 22.0     A-1 Antitrypsin 06/01/2023 145     Ferritin 06/01/2023 40     Hepatitis B Surface Ag 06/01/2023 Negative     Hep B Core Total Ab 06/01/2023 Negative     Hep A Total Ab 06/01/2023 Negative     Mitochondrial M2 Ab, IgG 06/01/2023 <20.0     Smooth Muscle Ab 06/01/2023 8    Abstract on 05/12/2023   Component Date Value    Hemoglobin A1C, External 08/22/2022 5.0    Office Visit on 03/23/2023   Component Date Value    Hemoglobin A1C 04/03/2023 4.6 (L)     eAG 04/03/2023 85     TSH 04/03/2023 1.670     T4 Free 04/03/2023 1.02     Vitamin B-12

## 2023-08-17 LAB
ANA SER QL: POSITIVE
CENTROMERE B AB SER-ACNC: <0.2 AI (ref 0–0.9)
CHROMATIN AB SERPL-ACNC: <0.2 AI (ref 0–0.9)
DSDNA AB SER-ACNC: <1 IU/ML (ref 0–9)
ENA JO1 AB SER-ACNC: <0.2 AI (ref 0–0.9)
ENA RNP AB SER-ACNC: <0.2 AI (ref 0–0.9)
ENA SCL70 AB SER-ACNC: <0.2 AI (ref 0–0.9)
ENA SM AB SER-ACNC: <0.2 AI (ref 0–0.9)
ENA SS-A AB SER-ACNC: >8 AI (ref 0–0.9)
ENA SS-B AB SER-ACNC: <0.2 AI (ref 0–0.9)
Lab: ABNORMAL

## 2023-08-18 LAB
ALBUMIN SERPL ELPH-MCNC: 4.1 G/DL (ref 2.9–4.4)
ALBUMIN/GLOB SERPL: 1.3 {RATIO} (ref 0.7–1.7)
ALPHA1 GLOB SERPL ELPH-MCNC: 0.2 G/DL (ref 0–0.4)
ALPHA2 GLOB SERPL ELPH-MCNC: 0.7 G/DL (ref 0.4–1)
B-GLOBULIN SERPL ELPH-MCNC: 1.2 G/DL (ref 0.7–1.3)
GAMMA GLOB SERPL ELPH-MCNC: 1.4 G/DL (ref 0.4–1.8)
GLOBULIN SER-MCNC: 3.4 G/DL (ref 2.2–3.9)
IGA SERPL-MCNC: 377 MG/DL (ref 87–352)
IGG SERPL-MCNC: 1287 MG/DL (ref 586–1602)
IGM SERPL-MCNC: 131 MG/DL (ref 26–217)
INTERPRETATION SERPL IEP-IMP: ABNORMAL
KAPPA LC FREE SER-MCNC: 17.6 MG/L (ref 3.3–19.4)
KAPPA LC FREE/LAMBDA FREE SER: 1.34 {RATIO} (ref 0.26–1.65)
LABORATORY COMMENT REPORT: ABNORMAL
LAMBDA LC FREE SERPL-MCNC: 13.1 MG/L (ref 5.7–26.3)
M PROTEIN SERPL ELPH-MCNC: ABNORMAL G/DL
PROT SERPL-MCNC: 7.5 G/DL (ref 6–8.5)

## 2023-08-23 ENCOUNTER — TELEPHONE (OUTPATIENT)
Age: 33
End: 2023-08-23

## 2023-08-23 NOTE — TELEPHONE ENCOUNTER
----- Message from DANIA Barros NP sent at 8/22/2023  1:28 PM EDT -----  Total Cholesterol is the total number of cholesterol particles in your blood. Goal is less than 200. Triglycerides are the short term fats in your blood. Goal is less than 150. HDL is the good cholesterol in your blood. Goal is more than 50 if you are a woman. LDL is the bad cholesterol in your blood. Goal is less than 100 unless you have heart disease and then goal is under 70. Continue to follow a low cholesterol diet. Try to limit the amount of fried foods, fatty foods, butter, gravy, red meat, ice cream, cheese, and eggs in your diet, which are all high in cholesterol.       Other labs ok---kidney fxn/lytes/liver fxn    Follow up with Dr Lana Cooper as scheduled

## 2023-08-24 ENCOUNTER — TELEPHONE (OUTPATIENT)
Age: 33
End: 2023-08-24

## 2023-08-24 NOTE — TELEPHONE ENCOUNTER
----- Message from Joshua Tam LPN sent at 3/24/4383  4:05 PM EDT -----  Patient of Dr. Samara Maza  ----- Message -----  From: DANIA Morataya NP  Sent: 8/22/2023   1:28 PM EDT  To: Joshua Tam LPN    Total Cholesterol is the total number of cholesterol particles in your blood. Goal is less than 200. Triglycerides are the short term fats in your blood. Goal is less than 150. HDL is the good cholesterol in your blood. Goal is more than 50 if you are a woman. LDL is the bad cholesterol in your blood. Goal is less than 100 unless you have heart disease and then goal is under 70. Continue to follow a low cholesterol diet. Try to limit the amount of fried foods, fatty foods, butter, gravy, red meat, ice cream, cheese, and eggs in your diet, which are all high in cholesterol.       Other labs ok---kidney fxn/lytes/liver fxn    Follow up with Dr Samara Maza as scheduled

## 2023-08-25 ENCOUNTER — HOSPITAL ENCOUNTER (OUTPATIENT)
Facility: HOSPITAL | Age: 33
Setting detail: OBSERVATION
Discharge: HOME | End: 2023-08-26
Attending: INTERNAL MEDICINE | Admitting: INTERNAL MEDICINE
Payer: COMMERCIAL

## 2023-08-25 DIAGNOSIS — I48.92 ATRIAL FLUTTER, UNSPECIFIED TYPE (HCC): ICD-10-CM

## 2023-08-25 PROBLEM — L76.82 BLEEDING AT INSERTION SITE: Status: ACTIVE | Noted: 2023-08-25

## 2023-08-25 PROBLEM — Z98.890 STATUS POST CATHETER ABLATION OF ATRIAL FLUTTER: Status: ACTIVE | Noted: 2023-08-25

## 2023-08-25 PROBLEM — R55 VASOVAGAL SYNCOPE: Status: ACTIVE | Noted: 2023-08-25

## 2023-08-25 LAB
ECHO BSA: 1.92 M2
HCG SERPL QL: NEGATIVE

## 2023-08-25 PROCEDURE — G0378 HOSPITAL OBSERVATION PER HR: HCPCS

## 2023-08-25 PROCEDURE — 93613 INTRACARDIAC EPHYS 3D MAPG: CPT | Performed by: INTERNAL MEDICINE

## 2023-08-25 PROCEDURE — 6370000000 HC RX 637 (ALT 250 FOR IP): Performed by: INTERNAL MEDICINE

## 2023-08-25 PROCEDURE — 99152 MOD SED SAME PHYS/QHP 5/>YRS: CPT | Performed by: INTERNAL MEDICINE

## 2023-08-25 PROCEDURE — 2709999900 HC NON-CHARGEABLE SUPPLY: Performed by: INTERNAL MEDICINE

## 2023-08-25 PROCEDURE — C1733 CATH, EP, OTHR THAN COOL-TIP: HCPCS | Performed by: INTERNAL MEDICINE

## 2023-08-25 PROCEDURE — 2580000003 HC RX 258: Performed by: INTERNAL MEDICINE

## 2023-08-25 PROCEDURE — C1730 CATH, EP, 19 OR FEW ELECT: HCPCS | Performed by: INTERNAL MEDICINE

## 2023-08-25 PROCEDURE — 36415 COLL VENOUS BLD VENIPUNCTURE: CPT

## 2023-08-25 PROCEDURE — 99153 MOD SED SAME PHYS/QHP EA: CPT | Performed by: INTERNAL MEDICINE

## 2023-08-25 PROCEDURE — 2720000010 HC SURG SUPPLY STERILE: Performed by: INTERNAL MEDICINE

## 2023-08-25 PROCEDURE — C1760 CLOSURE DEV, VASC: HCPCS | Performed by: INTERNAL MEDICINE

## 2023-08-25 PROCEDURE — C1731 CATH, EP, 20 OR MORE ELEC: HCPCS | Performed by: INTERNAL MEDICINE

## 2023-08-25 PROCEDURE — C1894 INTRO/SHEATH, NON-LASER: HCPCS | Performed by: INTERNAL MEDICINE

## 2023-08-25 PROCEDURE — 6360000002 HC RX W HCPCS: Performed by: INTERNAL MEDICINE

## 2023-08-25 PROCEDURE — 84703 CHORIONIC GONADOTROPIN ASSAY: CPT

## 2023-08-25 PROCEDURE — 2500000003 HC RX 250 WO HCPCS: Performed by: INTERNAL MEDICINE

## 2023-08-25 PROCEDURE — C1893 INTRO/SHEATH, FIXED,NON-PEEL: HCPCS | Performed by: INTERNAL MEDICINE

## 2023-08-25 PROCEDURE — 93653 COMPRE EP EVAL TX SVT: CPT | Performed by: INTERNAL MEDICINE

## 2023-08-25 RX ORDER — SODIUM CHLORIDE 0.9 % (FLUSH) 0.9 %
5-40 SYRINGE (ML) INJECTION PRN
Status: DISCONTINUED | OUTPATIENT
Start: 2023-08-25 | End: 2023-08-26 | Stop reason: HOSPADM

## 2023-08-25 RX ORDER — HYDROXYCHLOROQUINE SULFATE 200 MG/1
200 TABLET, FILM COATED ORAL 2 TIMES DAILY
Status: DISCONTINUED | OUTPATIENT
Start: 2023-08-25 | End: 2023-08-26 | Stop reason: HOSPADM

## 2023-08-25 RX ORDER — ACETAMINOPHEN 325 MG/1
650 TABLET ORAL EVERY 4 HOURS PRN
Status: DISCONTINUED | OUTPATIENT
Start: 2023-08-25 | End: 2023-08-25

## 2023-08-25 RX ORDER — ONDANSETRON 2 MG/ML
4 INJECTION INTRAMUSCULAR; INTRAVENOUS EVERY 6 HOURS PRN
Status: DISCONTINUED | OUTPATIENT
Start: 2023-08-25 | End: 2023-08-26 | Stop reason: HOSPADM

## 2023-08-25 RX ORDER — ONDANSETRON 2 MG/ML
4 INJECTION INTRAMUSCULAR; INTRAVENOUS EVERY 6 HOURS PRN
Status: DISCONTINUED | OUTPATIENT
Start: 2023-08-25 | End: 2023-08-25

## 2023-08-25 RX ORDER — SODIUM CHLORIDE 0.9 % (FLUSH) 0.9 %
5-40 SYRINGE (ML) INJECTION EVERY 12 HOURS SCHEDULED
Status: DISCONTINUED | OUTPATIENT
Start: 2023-08-25 | End: 2023-08-26 | Stop reason: HOSPADM

## 2023-08-25 RX ORDER — CEVIMELINE HYDROCHLORIDE 30 MG/1
30 CAPSULE ORAL 2 TIMES DAILY
Status: DISCONTINUED | OUTPATIENT
Start: 2023-08-25 | End: 2023-08-26 | Stop reason: HOSPADM

## 2023-08-25 RX ORDER — SODIUM CHLORIDE 9 MG/ML
INJECTION, SOLUTION INTRAVENOUS PRN
Status: DISCONTINUED | OUTPATIENT
Start: 2023-08-25 | End: 2023-08-25

## 2023-08-25 RX ORDER — FENTANYL CITRATE 50 UG/ML
INJECTION, SOLUTION INTRAMUSCULAR; INTRAVENOUS PRN
Status: DISCONTINUED | OUTPATIENT
Start: 2023-08-25 | End: 2023-08-25 | Stop reason: HOSPADM

## 2023-08-25 RX ORDER — LANOLIN ALCOHOL/MO/W.PET/CERES
200 CREAM (GRAM) TOPICAL DAILY
Status: DISCONTINUED | OUTPATIENT
Start: 2023-08-25 | End: 2023-08-26 | Stop reason: HOSPADM

## 2023-08-25 RX ORDER — DULOXETIN HYDROCHLORIDE 20 MG/1
40 CAPSULE, DELAYED RELEASE ORAL DAILY
Status: DISCONTINUED | OUTPATIENT
Start: 2023-08-25 | End: 2023-08-26 | Stop reason: HOSPADM

## 2023-08-25 RX ORDER — HYDROCODONE BITARTRATE AND ACETAMINOPHEN 5; 325 MG/1; MG/1
1 TABLET ORAL EVERY 6 HOURS PRN
Status: DISCONTINUED | OUTPATIENT
Start: 2023-08-25 | End: 2023-08-26 | Stop reason: HOSPADM

## 2023-08-25 RX ORDER — METHOCARBAMOL 500 MG/1
500 TABLET, FILM COATED ORAL 2 TIMES DAILY PRN
Status: DISCONTINUED | OUTPATIENT
Start: 2023-08-25 | End: 2023-08-26 | Stop reason: HOSPADM

## 2023-08-25 RX ORDER — ONDANSETRON 4 MG/1
4 TABLET, ORALLY DISINTEGRATING ORAL EVERY 8 HOURS PRN
Status: DISCONTINUED | OUTPATIENT
Start: 2023-08-25 | End: 2023-08-26 | Stop reason: HOSPADM

## 2023-08-25 RX ORDER — ACETAMINOPHEN 325 MG/1
650 TABLET ORAL EVERY 6 HOURS PRN
Status: DISCONTINUED | OUTPATIENT
Start: 2023-08-25 | End: 2023-08-26 | Stop reason: HOSPADM

## 2023-08-25 RX ORDER — ACETAMINOPHEN 650 MG/1
650 SUPPOSITORY RECTAL EVERY 6 HOURS PRN
Status: DISCONTINUED | OUTPATIENT
Start: 2023-08-25 | End: 2023-08-26 | Stop reason: HOSPADM

## 2023-08-25 RX ORDER — SODIUM CHLORIDE 9 MG/ML
INJECTION, SOLUTION INTRAVENOUS PRN
Status: DISCONTINUED | OUTPATIENT
Start: 2023-08-25 | End: 2023-08-26 | Stop reason: HOSPADM

## 2023-08-25 RX ORDER — MIDAZOLAM HYDROCHLORIDE 1 MG/ML
INJECTION INTRAMUSCULAR; INTRAVENOUS PRN
Status: DISCONTINUED | OUTPATIENT
Start: 2023-08-25 | End: 2023-08-25 | Stop reason: HOSPADM

## 2023-08-25 RX ORDER — SODIUM CHLORIDE 9 MG/ML
INJECTION, SOLUTION INTRAVENOUS CONTINUOUS PRN
Status: COMPLETED | OUTPATIENT
Start: 2023-08-25 | End: 2023-08-25

## 2023-08-25 RX ORDER — FAMOTIDINE 40 MG/5ML
40 POWDER, FOR SUSPENSION ORAL DAILY
Status: ACTIVE | OUTPATIENT
Start: 2023-08-25 | End: 2023-08-26

## 2023-08-25 RX ORDER — POLYETHYLENE GLYCOL 3350 17 G/17G
17 POWDER, FOR SOLUTION ORAL DAILY PRN
Status: DISCONTINUED | OUTPATIENT
Start: 2023-08-25 | End: 2023-08-26 | Stop reason: HOSPADM

## 2023-08-25 RX ORDER — DIPHENHYDRAMINE HYDROCHLORIDE 50 MG/ML
INJECTION INTRAMUSCULAR; INTRAVENOUS PRN
Status: DISCONTINUED | OUTPATIENT
Start: 2023-08-25 | End: 2023-08-25 | Stop reason: HOSPADM

## 2023-08-25 RX ADMIN — CEVIMELINE HYDROCHLORIDE 30 MG: 30 CAPSULE ORAL at 21:53

## 2023-08-25 RX ADMIN — SODIUM CHLORIDE, PRESERVATIVE FREE 10 ML: 5 INJECTION INTRAVENOUS at 21:59

## 2023-08-25 RX ADMIN — HYDROXYCHLOROQUINE SULFATE 200 MG: 200 TABLET ORAL at 21:54

## 2023-08-25 RX ADMIN — MAGNESIUM OXIDE TAB 400 MG (241.3 MG ELEMENTAL MG) 200 MG: 400 (241.3 MG) TAB at 21:54

## 2023-08-25 RX ADMIN — SODIUM CHLORIDE, PRESERVATIVE FREE 20 ML: 5 INJECTION INTRAVENOUS at 21:58

## 2023-08-25 NOTE — PROGRESS NOTES
Her nurse in recovery room called that she had right groin bleeding again after walking to bathroom and had vasovagal syncope   I will admit her overnight   Prolene figure 8 suture was placed by me for the right groin    I called and informed her   I will ask Dr Chris Teresa to see her tomorrow am and if there is no more bleeding and vasovagal dizziness or syncope she may go home patient pw feeling unwell. Common things being common, infection is most likely. Will need to sedate patient to a mild degree in order to facilitate procurement of blood and urine. patient pw feeling unwell. Common things being common, infection is most likely. Will need to sedate patient to a mild degree in order to facilitate procurement of blood and urine. Workup largely unremarkable with the exception of slightly elevated lactic acid. suspect patient was dehydrated. after liter of fluid, patient felt considerably better and perked up and was happy. she tolerated po. will dc.

## 2023-08-25 NOTE — PROGRESS NOTES
TRANSFER - OUT REPORT:    Verbal report given to Adriana Auguste RN on Belkis Calix  being transferred to [facility] for routine progression of patient care       Report consisted of patient's Situation, Background, Assessment and   Recommendations(SBAR). Information from the following report(s) Surgery Report, Intake/Output, MAR, and Recent Results was reviewed with the receiving nurse. Lines:   Peripheral IV 08/25/23 Arm (Active)   Site Assessment Clean, dry & intact 08/25/23 1142   Line Status Infusing 08/25/23 [address] [place] Connections checked and tightened 08/25/23 1142   Phlebitis Assessment No symptoms 08/25/23 1142   Infiltration Assessment 0 08/25/23 1142   Alcohol Cap Used Yes 08/25/23 1142   Dressing Status Clean, dry & intact 08/25/23 1142        Opportunity for questions and clarification was provided.       Patient transported with:  Registered Nurse

## 2023-08-25 NOTE — DISCHARGE INSTRUCTIONS
Patient Instructions Post-EP Study and Ablation    1. No heavy lifting or exercises for 1 week. This includes the following:  Do not push or move furniture, jog or run    2. Do not drive for 2 days. 3.  Call Dr. Jeanne Dorado at [phone] if you experience any of the following symptoms:  Dizziness, lightheadedness, fainting spells  Lack of energy  Shortness of breath  Rapid heart rate  Chest or muscle twitches  Blurry vision, double vision, weakness, numbness  Nausea, vomiting  Fever  Bleeding in the stool, black stool  Leg swelling, pain    4. Follow-up with Dr. Wesley Quintanilla NP as noted below at 9 AM.      Future Appointments   Date Time Provider [address] [place] [address]   9/8/2023  9:00 AM DANIA Brito NP BS AMB   12/4/2023  8:30 AM DANIA Vargas NP BS AMB   8/16/2024  8:00 AM Lluvia Pabon M.D.   Electrophysiology/Cardiology  [facility]  [address] [facility], [address] [place] [address]                               [phone]

## 2023-08-25 NOTE — PROGRESS NOTES
1437: Updated mother on patient status and discharge disposition. 1505: Head of bed elevated 30degrees right groin site without bleeding and no hematoma. [phone]: Educated patient about their sedation precautions such as not driving, operating any machinery, or signing legal documents 24 hours post procedure. Reviewed discharge instructions, including medications and site care using the teach back method. Answered all questions. Verbalized understanding. 1606: A large amount of bleeding noted while walking to bathroom. Pt placed back in bed in supine position and manual pressure held for five minutes. Hemostasis achieved. 1635: Dr. Sheeba Lewis at bedside. Suture placed in patient's groin. 1810: Pt ambulating to the bathroom. A large amount of bleeding noted at right groin site. Pressure held while pt placed in wheel chair. Pt passed out while be placed back in bed. 1815: /70. VSS. Hemostasis achieved. Dr. Sheeba Lewis notified. Orders given to admit pt for observation overnight.

## 2023-08-25 NOTE — PROGRESS NOTES
Cardiac Cath Lab Recovery Arrival Note:      Toby Weathers arrived to Cardiac Cath Lab, Recovery Area. Staff introduced to patient. Patient identifiers verified with NAME and DATE OF BIRTH. Procedure verified with patient. Consent forms reviewed and signed by patient or authorized representative and verified. Allergies verified. Patient and family oriented to department. Patient and family informed of procedure and plan of care. Questions answered with review. Patient prepped for procedure, per orders from physician, prior to arrival.    Patient on cardiac monitor, non-invasive blood pressure, SPO2 monitor. On Room Air. Patient is A&Ox 4. Patient reports No Pain. Patient in stretcher, in low position, with side rails up, call bell within reach, patient instructed to call if assistance as needed. Patient prep in: [address] [place] [address] [address]. Patient family : Mom/ Molly Ny in: Waiting Room .    Prep by: Ernie Tamayo RN

## 2023-08-25 NOTE — PROGRESS NOTES
EP LAB to Recovery Room Report    Procedure: RF A. Flutter Ablation    MD: Afia  Pre-procedure heart rhythm: 2:1 Flutter  Verbal Report given to Recovery Nurse on patient being transferred to Recovery Room for routine post-op. Patient stable upon transfer to . Pt had conscious sedation. Vitals, mental status, MAR, procedural summary discussed with recovery RN. Conscious sedation medication given by EP RN:  Versed 9 mg  Fentanyl 125mcg  Benadryl 25mg    Post-procedure heart rhythm: NSR    Sheaths:  1352hrs:Right femoral vein 6fr sheath removed, closed with Perclose. Right femoral vein 5fr sheath removed, closed with Perclose. 1357hrs: Right femoral vein 6fr sheath removed, closed with Perclose.

## 2023-08-25 NOTE — PROCEDURES
Cardiac Procedure Note   Patient: Belkis Calix  MRN: [id number]  SSN: [id number]   YOB: 1990 Age: 28 y.o.   Sex: female    Date of Procedure: 8/25/2023   Pre-procedure Diagnosis: palpitation, PSVT vs Atrial Flutter  Post-procedure Diagnosis: atrial flutter  Procedure: EP Study and Ablation of typical atrial flutter  :  Dr. Montrell Coronado MD    Assistant(s):  None  Anesthesia: Moderate Sedation   Estimated Blood Loss: Less than 10 mL   Specimens Removed: None  Findings:   EP study: no VA conduction   No dual av node   No SVT  CTI atrial flutter ablation with bidirectional line of block  Hard to sedate with versed and fentanyl so benadryl was given  Complications: None   Implants:  None  Signed by:  Montrell Coronado MD  8/25/2023  2:11 PM

## 2023-08-25 NOTE — INTERVAL H&P NOTE
Update History & Physical    The patient's History and Physical of August 14, 2023 was reviewed with the patient and I examined the patient. There was no change. The surgical site was confirmed by the patient and me. Plan: The risks, benefits, expected outcome, and alternative to the recommended procedure have been discussed with the patient. Patient understands and wants to proceed with the procedure.      Electronically signed by Riley Valenzuela MD on 8/25/2023 at 10:55 AM

## 2023-08-25 NOTE — DISCHARGE SUMMARY
and dry, not diaphoretic. Psychiatric: normal mood and affect. Behavior is normal. Judgment and thought content normal.        Disposition: home         Reference discharge instructions provided by nursing for diet and activity.     Follow-up     Future Appointments   Date Time Provider [address]  [facility]   9/8/2023  9:00 AM DANIA eCvallos NP AMB   12/4/2023  8:30 AM DANIA Childress NP BS AMB   8/16/2024  8:00 AM DO ARTEMIO Foley BS AMB         Signed:  Jen Fitzgerald MD 12:39 PM

## 2023-08-25 NOTE — PROGRESS NOTES
Cardiac Cath Lab Procedure Area Arrival Note:    Kayy Counter arrived to Cardiac Cath Lab, Procedure Area. Patient identifiers verified with NAME and DATE OF BIRTH. Procedure verified with patient. Consent forms verified. Allergies verified. Patient informed of procedure and plan of care. Questions answered with review. Patient voiced understanding of procedure and plan of care. Patient on cardiac monitor, non-invasive blood pressure, SPO2 monitor. On room air; placed on O2 @ 2 lpm via nasal cannula. IV of normal saline on pump at 50 ml/hr. Patient status doing well without problems. Patient is A&Ox 4. Patient reports no complaints of pain. SBP 90's--normal for patient. Patient medicated during procedure with orders obtained and verified by Dr. Irlanda Mojica. Refer to patients Cardiac Cath Lab PROCEDURE REPORT for vital signs, assessment, status, and response during procedure, printed at end of case. Printed report on chart or scanned into chart.

## 2023-08-26 VITALS
WEIGHT: 174.38 LBS | OXYGEN SATURATION: 97 % | HEART RATE: 94 BPM | DIASTOLIC BLOOD PRESSURE: 64 MMHG | SYSTOLIC BLOOD PRESSURE: 104 MMHG | BODY MASS INDEX: 26.43 KG/M2 | TEMPERATURE: 98 F | RESPIRATION RATE: 18 BRPM | HEIGHT: 68 IN

## 2023-08-26 PROBLEM — I48.92 ATRIAL FLUTTER (HCC): Status: ACTIVE | Noted: 2023-08-26

## 2023-08-26 LAB
COMMENT:: NORMAL
ERYTHROCYTE [DISTWIDTH] IN BLOOD BY AUTOMATED COUNT: 12 % (ref 11.5–14.5)
HCT VFR BLD AUTO: 37.2 % (ref 35–47)
HGB BLD-MCNC: 12.5 G/DL (ref 11.5–16)
INR PPP: 1.1 (ref 0.9–1.1)
MCH RBC QN AUTO: 30 PG (ref 26–34)
MCHC RBC AUTO-ENTMCNC: 33.6 G/DL (ref 30–36.5)
MCV RBC AUTO: 89.2 FL (ref 80–99)
NRBC # BLD: 0 K/UL (ref 0–0.01)
NRBC BLD-RTO: 0 PER 100 WBC
PLATELET # BLD AUTO: 238 K/UL (ref 150–400)
PMV BLD AUTO: 11.3 FL (ref 8.9–12.9)
PROTHROMBIN TIME: 11.1 SEC (ref 9–11.1)
RBC # BLD AUTO: 4.17 M/UL (ref 3.8–5.2)
SPECIMEN HOLD: NORMAL
WBC # BLD AUTO: 12.4 K/UL (ref 3.6–11)

## 2023-08-26 PROCEDURE — 6370000000 HC RX 637 (ALT 250 FOR IP): Performed by: INTERNAL MEDICINE

## 2023-08-26 PROCEDURE — 2580000003 HC RX 258: Performed by: INTERNAL MEDICINE

## 2023-08-26 PROCEDURE — 85610 PROTHROMBIN TIME: CPT

## 2023-08-26 PROCEDURE — 85027 COMPLETE CBC AUTOMATED: CPT

## 2023-08-26 PROCEDURE — 99238 HOSP IP/OBS DSCHRG MGMT 30/<: CPT | Performed by: SPECIALIST

## 2023-08-26 PROCEDURE — G0378 HOSPITAL OBSERVATION PER HR: HCPCS

## 2023-08-26 PROCEDURE — 36415 COLL VENOUS BLD VENIPUNCTURE: CPT

## 2023-08-26 RX ADMIN — DULOXETINE HYDROCHLORIDE 40 MG: 20 CAPSULE, DELAYED RELEASE ORAL at 09:28

## 2023-08-26 RX ADMIN — CEVIMELINE HYDROCHLORIDE 30 MG: 30 CAPSULE ORAL at 09:27

## 2023-08-26 RX ADMIN — SODIUM CHLORIDE, PRESERVATIVE FREE 20 ML: 5 INJECTION INTRAVENOUS at 09:30

## 2023-08-26 RX ADMIN — SODIUM CHLORIDE, PRESERVATIVE FREE 10 ML: 5 INJECTION INTRAVENOUS at 09:31

## 2023-08-26 RX ADMIN — HYDROXYCHLOROQUINE SULFATE 200 MG: 200 TABLET ORAL at 09:28

## 2023-08-26 NOTE — PROGRESS NOTES
0745:  Bedside shift change report given to Liban Goetz and Fely (oncoming nurse) by Johnson Guzmán (offgoing nurse). Report included the following information Nurse Handoff Report, Index, Adult Overview, Intake/Output, MAR, and Recent Results. 1050:  Discharge instructions, medication education, and follow-up appointments reviewed with patient. RN provided opportunity for questions, concerns and/or clarifications. Pt denies any questions or concerns at this time. Pt verbalized understanding of all discharge instructions. Patient taken down for discharge.

## 2023-08-26 NOTE — PLAN OF CARE
Problem: Discharge Planning  Goal: Discharge to home or other facility with appropriate resources  8/26/2023 0950 by Abril Luna, RN  Outcome: Progressing  Flowsheets (Taken 8/26/2023 0859 by Denae Leblanc RN)  Discharge to home or other facility with appropriate resources: Identify barriers to discharge with patient and caregiver

## 2023-08-26 NOTE — PLAN OF CARE
2230 patient ambulated after on bedrest, tolerated well without complications. R groin clean, dry, and intact. No bleeding or hematoma.     Problem: Discharge Planning  Goal: Discharge to home or other facility with appropriate resources  Outcome: Progressing

## 2023-08-28 ENCOUNTER — TELEPHONE (OUTPATIENT)
Age: 33
End: 2023-08-28

## 2023-08-28 NOTE — TELEPHONE ENCOUNTER
Verified patient with two types of identifiers. Patient came to office to have right groin suture removed. Suture removed without difficulty. No drainage noted. Only small amount of light purple bruising on the groin. Asked patient to call for new swelling or new bruising.   Patient verbalized understanding and will call with any other questions.        ----- Message from Melida Garrett MD sent at 8/25/2023  6:39 PM EDT -----  Pls remove prolene suture from her right groin on Monday

## 2023-08-30 ENCOUNTER — TELEPHONE (OUTPATIENT)
Age: 33
End: 2023-08-30

## 2023-08-30 NOTE — TELEPHONE ENCOUNTER
Called and spoke to the Pt. Two identifiers verified. Per DR. Cabrera:    Labs c/w Sjogren's, otherwise no significant abnormalities.

## 2023-08-30 NOTE — TELEPHONE ENCOUNTER
----- Message from [facility] ED, DO sent at 8/29/2023  1:01 PM EDT -----  Labs c/w Sjogren's, otherwise no significant abnormalities.  RMD  ----- Message -----  From: Marnie George Incoming Amb Ref Lab Orders To [facility]  Sent: 8/17/2023   1:49 PM EDT  To: [facility] ED, DO

## 2023-08-31 NOTE — PROGRESS NOTES
[facility] Cardiology  Cardiac Electrophysiology Clinic Care Note                  []Initial visit     [x]Established visit     Patient Name: Soha Joseph - YRW:[id number] - [facility]:[id number]  Primary Cardiologist: Nancy Ponce MD  Electrophysiologist: Peggy Bonds MD     Reason for visit: AFL ablation follow up    HPI:  Ms. Isabell George is a 28 y.o. female who presents for follow up s/p ablation of typical AFL on 08/25/2023. Since procedure, she reports doing well. Occasional lightheadedness that's typical for her, but she denies sustained palpitations. Rare fleeting chest discomfort, not associated with exertion. She continues Toprol XL. ECG today shows NSR 72 bpm with negative precordial T waves. Loop monitor in 03/22/2023 showed possible  bpm.    Exercise stress test in 09/2022 was unremarkable. [id number] 1 (female). Not on [address] Previous:  S/p ablation of typical AFL 08/25/2023. Unable to tolerate higher dose Toprol XL. Sjogrens & Hashimoto's, followed by Dr. Guero James (rheumatology). Family history of early CAD. Assessment and Plan     Typical AFL: Possibly noted via loop monitor in 03/2023. Now s/p ablation of typical AFL on 08/25/2023. No known recurrence since then. Stopping Toprol XL today. [id number] 1 (female), not on [address] Sinus tachycardia: Patient & Dr. Caitlin Davila had previously discussed switching from Toprol XL to Corlanor. Will prescribe Corlanor 5 mg po bid. If she has problems with affordability/insurance approval, Dr. Caitlin Davila can provide her with a paper prescription that may be sent to [facility]. She will let us know if she doesn't tolerate Corlanor; in that case, would switch back to Toprol XL. Follow up in EP clinic as noted below.     Future Appointments   Date Time Provider [address]   12/4/2023  8:30 AM DANIA Akins NP BS AMB   3/19/2024 11:20 AM MD SANDIP Souza BS AMB   8/16/2024  8:00 AM James Corley

## 2023-09-08 ENCOUNTER — OFFICE VISIT (OUTPATIENT)
Age: 33
End: 2023-09-08
Payer: COMMERCIAL

## 2023-09-08 VITALS
HEART RATE: 92 BPM | SYSTOLIC BLOOD PRESSURE: 100 MMHG | DIASTOLIC BLOOD PRESSURE: 80 MMHG | HEIGHT: 68 IN | BODY MASS INDEX: 25.34 KG/M2 | OXYGEN SATURATION: 100 % | WEIGHT: 167.2 LBS

## 2023-09-08 DIAGNOSIS — Z98.890 S/P ABLATION OF ATRIAL FLUTTER: ICD-10-CM

## 2023-09-08 DIAGNOSIS — G90.A POTS (POSTURAL ORTHOSTATIC TACHYCARDIA SYNDROME): ICD-10-CM

## 2023-09-08 DIAGNOSIS — Z86.79 S/P ABLATION OF ATRIAL FLUTTER: ICD-10-CM

## 2023-09-08 DIAGNOSIS — R00.2 PALPITATIONS: Primary | ICD-10-CM

## 2023-09-08 DIAGNOSIS — R00.0 SINUS TACHYCARDIA: ICD-10-CM

## 2023-09-08 DIAGNOSIS — I48.3 TYPICAL ATRIAL FLUTTER (HCC): ICD-10-CM

## 2023-09-08 PROCEDURE — 93000 ELECTROCARDIOGRAM COMPLETE: CPT | Performed by: NURSE PRACTITIONER

## 2023-09-08 PROCEDURE — 99214 OFFICE O/P EST MOD 30 MIN: CPT | Performed by: NURSE PRACTITIONER

## 2023-09-08 RX ORDER — FAMOTIDINE 40 MG/1
TABLET, FILM COATED ORAL
COMMUNITY
Start: 2023-08-30

## 2023-09-08 ASSESSMENT — PATIENT HEALTH QUESTIONNAIRE - PHQ9
SUM OF ALL RESPONSES TO PHQ QUESTIONS 1-9: 0
SUM OF ALL RESPONSES TO PHQ QUESTIONS 1-9: 0
1. LITTLE INTEREST OR PLEASURE IN DOING THINGS: 0
2. FEELING DOWN, DEPRESSED OR HOPELESS: 0
SUM OF ALL RESPONSES TO PHQ QUESTIONS 1-9: 0
SUM OF ALL RESPONSES TO PHQ9 QUESTIONS 1 & 2: 0
SUM OF ALL RESPONSES TO PHQ QUESTIONS 1-9: 0

## 2023-09-08 NOTE — PATIENT INSTRUCTIONS
Stop taking metoprolol. Start taking corlanor 5 mg twice a day. Follow up with Dr. Zack Ross in 6-8 months.

## 2023-09-11 RX ORDER — METOPROLOL SUCCINATE 25 MG/1
25 TABLET, EXTENDED RELEASE ORAL DAILY
Qty: 90 TABLET | OUTPATIENT
Start: 2023-09-11

## 2023-09-11 NOTE — TELEPHONE ENCOUNTER
Requested Prescriptions     Refused Prescriptions Disp Refills    metoprolol succinate (TOPROL XL) 25 MG extended release tablet [Pharmacy Med Name: METOPROLOL ER SUCCINATE 25MG TABS] 90 tablet      Sig: TAKE 1 TABLET BY MOUTH DAILY     VO per Dr. Gearlean Sicard   Date Time Provider [address]00  [address]   12/4/2023  8:30 AM DANIA Henderson NP BS AMB   3/19/2024 11:20 AM MD SANDIP Fulton BS AMB   8/16/2024  8:00 AM DO ARTEMIO Tamez BS AMB

## 2023-09-15 ENCOUNTER — TELEPHONE (OUTPATIENT)
Age: 33
End: 2023-09-15

## 2023-09-15 NOTE — TELEPHONE ENCOUNTER
Spoke with [employer] for [facility] prior authorization. @ [phone]. Submitted information for corlanor 5 mg bid. Reference # [id number]. Requires clinical review for determination. Will take approximately 5 business days.

## 2023-09-21 ENCOUNTER — HOSPITAL ENCOUNTER (OUTPATIENT)
Facility: HOSPITAL | Age: 33
Discharge: HOME | End: 2023-09-21
Attending: INTERNAL MEDICINE
Payer: COMMERCIAL

## 2023-09-21 ENCOUNTER — TELEPHONE (OUTPATIENT)
Age: 33
End: 2023-09-21

## 2023-09-21 DIAGNOSIS — M35.00 SJOGREN'S SYNDROME, WITH UNSPECIFIED ORGAN INVOLVEMENT (HCC): ICD-10-CM

## 2023-09-21 DIAGNOSIS — G90.A POTS (POSTURAL ORTHOSTATIC TACHYCARDIA SYNDROME): ICD-10-CM

## 2023-09-21 DIAGNOSIS — K76.0 FATTY LIVER: ICD-10-CM

## 2023-09-21 DIAGNOSIS — R12 HEARTBURN: ICD-10-CM

## 2023-09-21 DIAGNOSIS — K57.30 DIVERTICULA OF COLON: ICD-10-CM

## 2023-09-21 DIAGNOSIS — R10.13 EPIGASTRIC ABDOMINAL PAIN: ICD-10-CM

## 2023-09-21 DIAGNOSIS — R11.0 NAUSEA: ICD-10-CM

## 2023-09-21 PROCEDURE — 74220 X-RAY XM ESOPHAGUS 1CNTRST: CPT

## 2023-09-21 NOTE — TELEPHONE ENCOUNTER
Request for Corlanor denied by [employer] as this medication is not approved for tachycardia. Will follow up for further recommendations.

## 2023-09-25 RX ORDER — METOPROLOL SUCCINATE 25 MG/1
25 TABLET, EXTENDED RELEASE ORAL DAILY
Qty: 30 TABLET | Refills: 5 | Status: SHIPPED | OUTPATIENT
Start: 2023-09-25

## 2023-09-25 NOTE — TELEPHONE ENCOUNTER
Verified patient with two types of identifiers. Notified of denial and MD recommendations. She never stopped taking Toprol and has been tolerating well. She will continue at current dose. Requesting refill. Verified pharmacy. Patient verbalized understanding and will call with any other questions.       Future Appointments   Date Time Provider [address]  [address]   10/16/2023 12:00 PM [facility] [place] [facility]   10/16/2023  1:00 PM [facility] [place] [facility]   10/16/2023  2:00 PM [facility] [place] [facility]   10/16/2023  3:00 PM [facility] [place] [facility]   10/16/2023  4:00 PM [facility] [place] [facility]   12/4/2023  8:30 AM DANIA Cheney NP BS AMB   3/19/2024 11:20 AM MD SANDIP Encinas BS AMB   8/16/2024  8:00 AM DO ARTEMIO Moore BS AMB

## 2023-10-16 ENCOUNTER — HOSPITAL ENCOUNTER (OUTPATIENT)
Facility: HOSPITAL | Age: 33
Discharge: HOME | End: 2023-10-19
Attending: INTERNAL MEDICINE
Payer: COMMERCIAL

## 2023-10-16 DIAGNOSIS — K76.0 FATTY LIVER: ICD-10-CM

## 2023-10-16 DIAGNOSIS — K57.30 DIVERTICULA OF COLON: ICD-10-CM

## 2023-10-16 DIAGNOSIS — M35.00 SJOGREN'S SYNDROME, WITH UNSPECIFIED ORGAN INVOLVEMENT (HCC): ICD-10-CM

## 2023-10-16 DIAGNOSIS — R10.13 EPIGASTRIC ABDOMINAL PAIN: ICD-10-CM

## 2023-10-16 DIAGNOSIS — R12 HEARTBURN: ICD-10-CM

## 2023-10-16 DIAGNOSIS — R11.0 NAUSEA: ICD-10-CM

## 2023-10-16 DIAGNOSIS — G90.A POTS (POSTURAL ORTHOSTATIC TACHYCARDIA SYNDROME): ICD-10-CM

## 2023-10-16 PROCEDURE — 3430000000 HC RX DIAGNOSTIC RADIOPHARMACEUTICAL: Performed by: INTERNAL MEDICINE

## 2023-10-16 PROCEDURE — A9541 TC99M SULFUR COLLOID: HCPCS | Performed by: INTERNAL MEDICINE

## 2023-10-16 PROCEDURE — 78264 GASTRIC EMPTYING IMG STUDY: CPT

## 2023-10-16 RX ORDER — TECHNETIUM TC 99M SULFUR COLLOID 2 MG
1 KIT MISCELLANEOUS
Status: COMPLETED | OUTPATIENT
Start: 2023-10-16 | End: 2023-10-16

## 2023-10-16 RX ADMIN — TECHNETIUM TC 99M SULFUR COLLOID 1 MILLICURIE: KIT at 12:25

## 2023-12-13 ENCOUNTER — OFFICE VISIT (OUTPATIENT)
Age: 33
End: 2023-12-13
Payer: COMMERCIAL

## 2023-12-13 VITALS
WEIGHT: 178 LBS | HEART RATE: 83 BPM | BODY MASS INDEX: 26.98 KG/M2 | DIASTOLIC BLOOD PRESSURE: 71 MMHG | SYSTOLIC BLOOD PRESSURE: 108 MMHG | HEIGHT: 68 IN | TEMPERATURE: 97 F

## 2023-12-13 DIAGNOSIS — K76.0 FATTY LIVER: Primary | ICD-10-CM

## 2023-12-13 PROCEDURE — 99213 OFFICE O/P EST LOW 20 MIN: CPT | Performed by: NURSE PRACTITIONER

## 2023-12-13 PROCEDURE — 91200 LIVER ELASTOGRAPHY: CPT | Performed by: NURSE PRACTITIONER

## 2023-12-14 LAB
BASOPHILS # BLD AUTO: 0.1 X10E3/UL (ref 0–0.2)
BASOPHILS NFR BLD AUTO: 1 %
EOSINOPHIL # BLD AUTO: 0.1 X10E3/UL (ref 0–0.4)
EOSINOPHIL NFR BLD AUTO: 1 %
ERYTHROCYTE [DISTWIDTH] IN BLOOD BY AUTOMATED COUNT: 12.6 % (ref 11.7–15.4)
HCT VFR BLD AUTO: 41 % (ref 34–46.6)
HGB BLD-MCNC: 14.1 G/DL (ref 11.1–15.9)
IMM GRANULOCYTES # BLD AUTO: 0 X10E3/UL (ref 0–0.1)
IMM GRANULOCYTES NFR BLD AUTO: 0 %
LYMPHOCYTES # BLD AUTO: 2.2 X10E3/UL (ref 0.7–3.1)
LYMPHOCYTES NFR BLD AUTO: 26 %
MCH RBC QN AUTO: 30.2 PG (ref 26.6–33)
MCHC RBC AUTO-ENTMCNC: 34.4 G/DL (ref 31.5–35.7)
MCV RBC AUTO: 88 FL (ref 79–97)
MONOCYTES # BLD AUTO: 0.8 X10E3/UL (ref 0.1–0.9)
MONOCYTES NFR BLD AUTO: 9 %
NEUTROPHILS # BLD AUTO: 5.4 X10E3/UL (ref 1.4–7)
NEUTROPHILS NFR BLD AUTO: 63 %
PLATELET # BLD AUTO: 323 X10E3/UL (ref 150–450)
RBC # BLD AUTO: 4.67 X10E6/UL (ref 3.77–5.28)
WBC # BLD AUTO: 8.6 X10E3/UL (ref 3.4–10.8)

## 2023-12-15 LAB
ALBUMIN SERPL-MCNC: 4.1 G/DL (ref 3.9–4.9)
ALP SERPL-CCNC: 107 IU/L (ref 44–121)
ALT SERPL-CCNC: 29 IU/L (ref 0–32)
AST SERPL-CCNC: 23 IU/L (ref 0–40)
BILIRUB DIRECT SERPL-MCNC: 0.12 MG/DL (ref 0–0.4)
BILIRUB SERPL-MCNC: 0.5 MG/DL (ref 0–1.2)
BUN SERPL-MCNC: 11 MG/DL (ref 6–20)
BUN/CREAT SERPL: 13 (ref 9–23)
CALCIUM SERPL-MCNC: 9.3 MG/DL (ref 8.7–10.2)
CHLORIDE SERPL-SCNC: 103 MMOL/L (ref 96–106)
CO2 SERPL-SCNC: 23 MMOL/L (ref 20–29)
CREAT SERPL-MCNC: 0.83 MG/DL (ref 0.57–1)
EGFRCR SERPLBLD CKD-EPI 2021: 95 ML/MIN/1.73
GLUCOSE SERPL-MCNC: 83 MG/DL (ref 70–99)
POTASSIUM SERPL-SCNC: 4.1 MMOL/L (ref 3.5–5.2)
PROT SERPL-MCNC: 6.9 G/DL (ref 6–8.5)
SODIUM SERPL-SCNC: 138 MMOL/L (ref 134–144)

## 2024-01-04 RX ORDER — METOPROLOL SUCCINATE 25 MG/1
25 TABLET, EXTENDED RELEASE ORAL DAILY
Qty: 30 TABLET | Refills: 5 | Status: SHIPPED | OUTPATIENT
Start: 2024-01-04

## 2024-01-04 NOTE — TELEPHONE ENCOUNTER
Med refilled per VO by MD.    Future Appointments   Date Time Provider Department Center   3/19/2024 11:20 AM Keyon Oreilly MD [facility] BS AMB   8/16/2024  8:00 AM Olga Cabrera DO NEUS[facility] BS AMB   12/9/2024  8:00 AM Angella Paez APRN - NP LIVR BS AMB

## 2024-03-19 ENCOUNTER — OFFICE VISIT (OUTPATIENT)
Age: 34
End: 2024-03-19
Payer: COMMERCIAL

## 2024-03-19 VITALS
HEART RATE: 87 BPM | WEIGHT: 186 LBS | DIASTOLIC BLOOD PRESSURE: 74 MMHG | SYSTOLIC BLOOD PRESSURE: 116 MMHG | BODY MASS INDEX: 28.19 KG/M2 | HEIGHT: 68 IN | OXYGEN SATURATION: 100 %

## 2024-03-19 DIAGNOSIS — R00.2 PALPITATIONS: ICD-10-CM

## 2024-03-19 DIAGNOSIS — I48.3 TYPICAL ATRIAL FLUTTER (HCC): Primary | ICD-10-CM

## 2024-03-19 DIAGNOSIS — Z98.890 S/P ABLATION OF ATRIAL FLUTTER: ICD-10-CM

## 2024-03-19 DIAGNOSIS — Z86.79 S/P ABLATION OF ATRIAL FLUTTER: ICD-10-CM

## 2024-03-19 DIAGNOSIS — R00.0 SINUS TACHYCARDIA: ICD-10-CM

## 2024-03-19 PROCEDURE — 99214 OFFICE O/P EST MOD 30 MIN: CPT | Performed by: INTERNAL MEDICINE

## 2024-03-19 ASSESSMENT — PATIENT HEALTH QUESTIONNAIRE - PHQ9
SUM OF ALL RESPONSES TO PHQ QUESTIONS 1-9: 0
1. LITTLE INTEREST OR PLEASURE IN DOING THINGS: NOT AT ALL
SUM OF ALL RESPONSES TO PHQ QUESTIONS 1-9: 0
SUM OF ALL RESPONSES TO PHQ9 QUESTIONS 1 & 2: 0
2. FEELING DOWN, DEPRESSED OR HOPELESS: NOT AT ALL

## 2024-03-19 NOTE — PROGRESS NOTES
Headache    · Ichthyosis   · Migraine    · MRSA (methicillin resistant staph aureus) culture positive   NEG AS OF  IN LABS   · Psychiatric disorder    ANXIETY   · Psychiatric problem 2015   anxiety - not currently medicated   · Raynaud disease 2023   · Sjogren's disease (HCC)   · Syncope   · Thyroid disease   MARKERS FOR SECONDART HOSHIMOTOS   · Tremor        Past Surgical History:   Procedure Laterality Date   ·  SECTION 2021   ·  SECTION    · DILATION AND CURETTAGE OF UTERUS 2019   · EP DEVICE PROCEDURE N/A 2023   Ablation A-flutter performed by Keyon Oreilly MD at [facility] CARDIAC CATH LAB   · EP DEVICE PROCEDURE N/A 2023   Ep 3d mapping performed by Keyon Oreilly MD at [facility] CARDIAC CATH LAB   · OTHER SURGICAL HISTORY    wisdom teeth removed       Allergies   Allergen Reactions   · Sulfamethoxazole-Trimethoprim Rash       Social History     Tobacco Use   · Smoking status: Never   · Smokeless tobacco: Never   Vaping Use   · Vaping Use: Never used   Substance Use Topics   · Alcohol use: Yes   Comment: rarely   · Drug use: No       Family History   Problem Relation Age of Onset   · No Known Problems Sister   · No Known Problems Mother   · No Known Problems Son   · No Known Problems Daughter   · Diabetes Father   · Hypertension Father   · Dementia Paternal Grandmother   · Anesth Problems Neg Hx           OBJECTIVE:     Physical Exam     Vitals:    24 1327   BP: 116/74   Pulse: 87   SpO2: 100%       General:  Alert, cooperative, no distress, appears stated age.   Neck:  Supple, no carotid bruit and no JVD.   Back:    Symmetric.   Lungs:    Clear to auscultation bilaterally.   Heart::  Regular rate and regular rhythm.  No murmur, click, rub or gallop.   Abdomen:    Soft, non-tender. Mildly obese  MSK:  Extremities normal, atraumatic.  Moves extremities independently.   Vasc/lymph:  No lower extremity edema.   Skin:  Skin color normal. No rashes or

## 2024-08-16 ENCOUNTER — OFFICE VISIT (OUTPATIENT)
Age: 34
End: 2024-08-16
Payer: COMMERCIAL

## 2024-08-16 VITALS
BODY MASS INDEX: 28.79 KG/M2 | HEART RATE: 78 BPM | HEIGHT: 68 IN | SYSTOLIC BLOOD PRESSURE: 122 MMHG | WEIGHT: 190 LBS | OXYGEN SATURATION: 98 % | DIASTOLIC BLOOD PRESSURE: 80 MMHG

## 2024-08-16 DIAGNOSIS — G43.109 MIGRAINE WITH AURA, NOT INTRACTABLE, WITHOUT STATUS MIGRAINOSUS: Primary | ICD-10-CM

## 2024-08-16 DIAGNOSIS — G90.A POTS (POSTURAL ORTHOSTATIC TACHYCARDIA SYNDROME): ICD-10-CM

## 2024-08-16 PROCEDURE — 99214 OFFICE O/P EST MOD 30 MIN: CPT | Performed by: PSYCHIATRY & NEUROLOGY

## 2024-08-16 NOTE — PROGRESS NOTES
Neurology Clinic Follow up Note    Patient ID:  Natty Hartley  [id number]  34 y.o.  1990      Ms. Hartley is here for follow up today of  Chief Complaint   Patient presents with    OTHER     Pt returning for H/O migraines Pt reports no migraines since last visit           Last Appointment With Me:  8/16/2023    \"Natty Hartley is presenting for evaluation of headaches. She reports a h/o headaches x 10 years.     Location: R or L frontal region  Character: Throbbing  Frequency: Sporadic 2x yearly  Duration: Few hours  Aura: Yes, visual aura  Associated Sx with HA: +nausea/vomiting, +phonophotophobia.    Neurological ROS: Occasional LUE weakness/numbness with headaches. H/O Sjogren's/fibromyalgia. Reports chronic imbalance which is intermittent (no falls), occasional numbness hands/feet b/l (neg prior EMG), R hand tremor (postural), palpitations, postural dizziness  Systemic ROS:   H/O Head trauma: None  Depressive or anxiety Sx: +Anxiety     Any change in pattern of HA? None    Triggers: Unknown. Non-positional.   Alleviating factors: Rest/sleep  FHx HA/migraine: None    Treatment so far: Ibuprofen PRN  Prior rescue therapy: Ubrelvy PRN-not effective    Investigations so far:   MRI Brain WWO 12/2020 normal per patient\"  Interval History:   No reported migraines since last visit. Has not needed Nurtec/rescue therapy.   Dizziness/LH related to POTS is intermittent, typically occurring when standing with occasional imbalance/lightheadedness. She is attempting to stay hydrated. No syncope. She is wearing compression hose to assist with above symptoms when at the office.     PMHx/ PSHx/ FHx/ SHx:  Reviewed and unchanged previous visit.   Past Medical History:   Diagnosis Date    Daniela Barr virus infection 2016    TREATED    GERD (gastroesophageal reflux disease)     Headache 2013    Ichthyosis     Migraine 2013    MRSA (methicillin resistant staph aureus) culture positive     NEG AS OF 2017 IN LABS    Psychiatric

## 2024-12-09 ENCOUNTER — OFFICE VISIT (OUTPATIENT)
Age: 34
End: 2024-12-09
Payer: COMMERCIAL

## 2024-12-09 VITALS
HEIGHT: 68 IN | TEMPERATURE: 97.9 F | SYSTOLIC BLOOD PRESSURE: 113 MMHG | OXYGEN SATURATION: 99 % | DIASTOLIC BLOOD PRESSURE: 79 MMHG | WEIGHT: 185.4 LBS | BODY MASS INDEX: 28.1 KG/M2 | HEART RATE: 76 BPM

## 2024-12-09 DIAGNOSIS — K76.0 FATTY LIVER: Primary | ICD-10-CM

## 2024-12-09 PROCEDURE — 91200 LIVER ELASTOGRAPHY: CPT | Performed by: NURSE PRACTITIONER

## 2024-12-09 PROCEDURE — 99214 OFFICE O/P EST MOD 30 MIN: CPT | Performed by: NURSE PRACTITIONER

## 2024-12-09 ASSESSMENT — ANXIETY QUESTIONNAIRES
3. WORRYING TOO MUCH ABOUT DIFFERENT THINGS: NOT AT ALL
7. FEELING AFRAID AS IF SOMETHING AWFUL MIGHT HAPPEN: NOT AT ALL
4. TROUBLE RELAXING: NOT AT ALL
1. FEELING NERVOUS, ANXIOUS, OR ON EDGE: NOT AT ALL
5. BEING SO RESTLESS THAT IT IS HARD TO SIT STILL: NOT AT ALL
GAD7 TOTAL SCORE: 0
6. BECOMING EASILY ANNOYED OR IRRITABLE: NOT AT ALL
IF YOU CHECKED OFF ANY PROBLEMS ON THIS QUESTIONNAIRE, HOW DIFFICULT HAVE THESE PROBLEMS MADE IT FOR YOU TO DO YOUR WORK, TAKE CARE OF THINGS AT HOME, OR GET ALONG WITH OTHER PEOPLE: NOT DIFFICULT AT ALL
2. NOT BEING ABLE TO STOP OR CONTROL WORRYING: NOT AT ALL

## 2024-12-09 ASSESSMENT — PATIENT HEALTH QUESTIONNAIRE - PHQ9
2. FEELING DOWN, DEPRESSED OR HOPELESS: NOT AT ALL
1. LITTLE INTEREST OR PLEASURE IN DOING THINGS: NOT AT ALL
SUM OF ALL RESPONSES TO PHQ QUESTIONS 1-9: 0
SUM OF ALL RESPONSES TO PHQ QUESTIONS 1-9: 0
DEPRESSION UNABLE TO ASSESS: FUNCTIONAL CAPACITY MOTIVATION LIMITS ACCURACY
SUM OF ALL RESPONSES TO PHQ QUESTIONS 1-9: 0
SUM OF ALL RESPONSES TO PHQ QUESTIONS 1-9: 0
SUM OF ALL RESPONSES TO PHQ9 QUESTIONS 1 & 2: 0

## 2024-12-09 NOTE — PROGRESS NOTES
Chief Complaint   Patient presents with    Follow-up     N/A     Vitals:    12/09/24 0806   BP: 113/79   Site: Left Upper Arm   Position: Sitting   Pulse: 76   Temp: 97.9 °F (36.6 °C)   TempSrc: Temporal   SpO2: 99%   Weight: 84.1 kg (185 lb 6.4 oz)   Height: 1.727 m (5' 8\")     .  \"Have you been to the ER, urgent care clinic since your last visit?  Hospitalized since your last visit?\"    NO    “Have you seen or consulted any other health care providers outside of [facility] since your last visit?”    NO     “Have you had a pap smear?”    YES - Where: [facility] Nurse/CMA to request most recent records if not in the chart    No cervical cancer screening on file             Click Here for Release of Records Request

## 2024-12-09 NOTE — PROGRESS NOTES
[facility]      Javon Olivas MD, FACP, FACG, FAASLD      AFUA Arita-C    Alessia Cowan, [facility]   Leigha Devi, [facility]   Angella Philippe, [facility]-  Yobani Zhang, [facility]-   Vesna Alejandra, [facility]   Aracelis Arnoldon, [facility]   [address], [address]   [place]  [address] [phone]   FAX: [phone]  [facility]   [address], [address]   [place]  [address] [phone]   FAX: [phone]           Patient Care Team:  Joceline Streeter PA as PCP - General  Joceline Streeter PA as PCP - Empaneled Provider  Keyon Oreilly MD as Consulting Physician (Cardiology)  José Miguel Brian MD (Gastroenterology)            Natty Hartley is being seen at [facility] for management of Steatotic Liver Disease (SLD) formerly called fatty liver.  The active problem list, all pertinent past medical history, medications, liver histology, endoscopic studies, radiologic findings and laboratory findings related to the liver disorder were reviewed and discussed with the patient.      The patient is a 34 y.o. female who is suspected to have fatty liver disease based upon CT scan.    Serologic evaluation for markers of chronic liver disease was positive for CHANO,     The most recent imaging of the liver was CT performed in 2/2023.  Results suggest fatty liver disease.  No liver mass lesions noted.    An assessment of liver fibrosis with fibroscan was performed in 5/2023.   This demonstrated no fibrosis.      In the office today the patient has the following symptoms:  The patient feels well and has no complaints.    The patient is not experiencing the following symptoms which are commonly seen in this liver disorder:   fatigue,   pain in the right side over the liver,     The patient completes

## 2025-08-20 ENCOUNTER — OFFICE VISIT (OUTPATIENT)
Age: 35
End: 2025-08-20
Payer: COMMERCIAL

## 2025-08-20 VITALS
HEART RATE: 93 BPM | WEIGHT: 190 LBS | HEIGHT: 68 IN | SYSTOLIC BLOOD PRESSURE: 122 MMHG | OXYGEN SATURATION: 99 % | DIASTOLIC BLOOD PRESSURE: 80 MMHG | BODY MASS INDEX: 28.79 KG/M2

## 2025-08-20 DIAGNOSIS — G90.A POTS (POSTURAL ORTHOSTATIC TACHYCARDIA SYNDROME): ICD-10-CM

## 2025-08-20 DIAGNOSIS — G43.109 MIGRAINE WITH AURA, NOT INTRACTABLE, WITHOUT STATUS MIGRAINOSUS: Primary | ICD-10-CM

## 2025-08-20 PROCEDURE — 99214 OFFICE O/P EST MOD 30 MIN: CPT | Performed by: PSYCHIATRY & NEUROLOGY

## 2025-08-20 RX ORDER — RIMEGEPANT SULFATE 75 MG/75MG
75 TABLET, ORALLY DISINTEGRATING ORAL PRN
Qty: 8 TABLET | Refills: 5 | Status: ACTIVE | OUTPATIENT
Start: 2025-08-20

## (undated) DEVICE — SCISSORS ENDOSCP DIA5MM CRV MPLR CAUT W/ RATCH HNDL

## (undated) DEVICE — SUTURE VCRL 0 L27IN ABSRB VLT CT L40MM 1/2 CIR TAPERPOINT J352H

## (undated) DEVICE — ENDO CARRY-ON PROCEDURE KIT INCLUDES ENZYMATIC SPONGE, GAUZE, BIOHAZARD LABEL, TRAY, LUBRICANT, DIRTY SCOPE LABEL, WATER LABEL, TRAY, DRAWSTRING PAD, AND DEFENDO 4-PIECE KIT.: Brand: ENDO CARRY-ON PROCEDURE KIT

## (undated) DEVICE — ELECTRODE PT RET AD L9FT HI MOIST COND ADH HYDRGEL CORDED

## (undated) DEVICE — REM POLYHESIVE ADULT PATIENT RETURN ELECTRODE: Brand: VALLEYLAB

## (undated) DEVICE — NEEDLE ANGIO 18GAX7CM SECURELOC

## (undated) DEVICE — ENDOLOOP SUT LIGATURE 18IN -- 12/BX PDS II

## (undated) DEVICE — LIMB HOLDER, WRIST/ANKLE: Brand: DEROYAL

## (undated) DEVICE — CATHETER ELECTROPHYSIOLOGY JSN 5 MM 5 FRX120 CM SUPREME

## (undated) DEVICE — INTRODUCER SHTH 8.5FR L63CM 8.5FR DIL GWIRE L145CM

## (undated) DEVICE — PADPRO DEFIBRILLATION/PACING/CARDIOVERSION/MONITORING ELECTRODES, ADULT/CHILD GREATER THAN 10 KG RADIOTRANSPARENT ELECTRODE, PHYSIO-CONTROL QUIK-COMBO (M) 60" (152 CM): Brand: PADPRO

## (undated) DEVICE — PINNACLE INTRODUCER SHEATH: Brand: PINNACLE

## (undated) DEVICE — STERILE POLYISOPRENE POWDER-FREE SURGICAL GLOVES: Brand: PROTEXIS

## (undated) DEVICE — WASTE KIT - ST MARY: Brand: MEDLINE INDUSTRIES, INC.

## (undated) DEVICE — Device: Brand: PORTEX

## (undated) DEVICE — ELECTRODE ES 36CM LAP FLAT L HK COAT DISP CLEANCOAT

## (undated) DEVICE — DRESSING AQUACEL ADVANTAGE ALG W4XL5IN RECT GREATER ABSRB HYDRFBR TECHNOLOGY

## (undated) DEVICE — 3M™ TEGADERM™ TRANSPARENT FILM DRESSING FRAME STYLE, 1626W, 4 IN X 4-3/4 IN (10 CM X 12 CM), 50/CT 4CT/CASE: Brand: 3M™ TEGADERM™

## (undated) DEVICE — LIGHT HANDLE: Brand: DEVON

## (undated) DEVICE — KENDALL SCD EXPRESS SLEEVES, KNEE LENGTH, MEDIUM: Brand: KENDALL SCD

## (undated) DEVICE — CATHETER ELECTROPHYSIOLOGY CRD 2 10 MM 5 FRX120 CM QPLR 1 MM

## (undated) DEVICE — STERILE POLYISOPRENE POWDER-FREE SURGICAL GLOVES WITH EMOLLIENT COATING: Brand: PROTEXIS

## (undated) DEVICE — GOWN,ECLIPSE,FABRNF,XL,30/CS: Brand: MEDLINE

## (undated) DEVICE — SOLUTION IV 1000ML 0.9% SOD CHL

## (undated) DEVICE — BAG SPEC REM 224ML W4XL6IN DIA10MM 1 HND GYN DISP ENDOPCH

## (undated) DEVICE — PERCLOSE PROGLIDE™ SUTURE-MEDIATED CLOSURE SYSTEM: Brand: PERCLOSE PROGLIDE™

## (undated) DEVICE — GARMENT,MEDLINE,DVT,INT,CALF,MED, GEN2: Brand: MEDLINE

## (undated) DEVICE — TROCAR ENDOSCP L100MM DIA5MM BLDELSS STBL SL OBT RADLUC

## (undated) DEVICE — COVERALL PREM SMS 2XL KNIT --

## (undated) DEVICE — GLOVE SURG SZ 65 L12IN FNGR THK94MIL STD WHT LTX FREE

## (undated) DEVICE — CATH FOLEY 16F LUBRI-SIL IC --

## (undated) DEVICE — TUBING, SUCTION, 1/4" X 12', STRAIGHT: Brand: MEDLINE

## (undated) DEVICE — SUT CHRMC 1 36IN CTX BRN --

## (undated) DEVICE — TRAY SPNL 24GA ANES

## (undated) DEVICE — CABLE CATHETER PINX4 ENSITE X SUPREME

## (undated) DEVICE — SUTURE MCRYL SZ 4-0 L27IN ABSRB UD L19MM PS-2 1/2 CIR PRIM Y426H

## (undated) DEVICE — COVER LT HNDL PLAS RIG 1 PER PK

## (undated) DEVICE — ROYAL SILK SURGICAL GOWN, XXL: Brand: CONVERTORS

## (undated) DEVICE — Z DUP USE 2227076 BARRIER ADH TISS 4-SECTION SEPRAFILM

## (undated) DEVICE — Device

## (undated) DEVICE — ROYALSILK SURGICAL GOWN, L: Brand: CONVERTORS

## (undated) DEVICE — NDL SPNE QNCKE 22GX3.5IN LF --

## (undated) DEVICE — SOLIDIFIER MEDC 1200ML -- CONVERT TO 356117

## (undated) DEVICE — MEDI-VAC NON-CONDUCTIVE SUCTION TUBING: Brand: CARDINAL HEALTH

## (undated) DEVICE — SET CHOLANGIOGRAPHY 4FR L60CM W/ ARW KARLAN BLLN CATH CRV

## (undated) DEVICE — CATHETER REPROC ELECTROPHYSIOLOGY 2-5-2 MM BAR201101R

## (undated) DEVICE — SOLUTION IRRIG 1000ML H2O STRL BLT

## (undated) DEVICE — GLOVE SURG SZ 7 L12IN FNGR THK79MIL GRN LTX FREE

## (undated) DEVICE — (D)PREP SKN CHLRAPRP APPL 26ML -- CONVERT TO ITEM 371833

## (undated) DEVICE — CABLE CATH L210CM 10 PIN PROTECTED SURELINK WOVENFLEXIE

## (undated) DEVICE — PACK PROCEDURE SURG C SECT KT SMH

## (undated) DEVICE — SPONGE: LAP 18X18 W  200/CS: Brand: MEDICAL ACTION INDUSTRIES

## (undated) DEVICE — CABLE EP L8FT EXTN CATH SAFIRE 1642 TX

## (undated) DEVICE — SYSTEM EVAC SMOKE LAPARSCOPIC

## (undated) DEVICE — APPLIER CLP M/L SHFT DIA5MM 15 LIG LIGAMAX 5

## (undated) DEVICE — GENERAL LAPAROSCOPY - SMH: Brand: MEDLINE INDUSTRIES, INC.

## (undated) DEVICE — DRAPE FLD WRM W44XL66IN C6L FOR INTRATEMP SYS THERMABASIN

## (undated) DEVICE — POOLE SUCTION INSTRUMENT WITH REMOVABLE SHEATH: Brand: POOLE

## (undated) DEVICE — ELECTRODE,RADIOTRANSLUCENT,FOAM,5PK: Brand: MEDLINE

## (undated) DEVICE — TROCAR ENDOSCP L100MM DIA5MM BLDELSS STBL SL THRD OPT VW

## (undated) DEVICE — TROCAR ENDOSCP L100MM DIA12MM BLDELSS OBT RADLUC STBL SL

## (undated) DEVICE — DERMABOND SKIN ADH 0.7ML -- DERMABOND ADVANCED 12/BX

## (undated) DEVICE — 3000CC GUARDIAN II: Brand: GUARDIAN

## (undated) DEVICE — SUTURE PDS II SZ 0 L27IN ABSRB VLT L26MM CT-2 1/2 CIR Z334H

## (undated) DEVICE — CABLE CATHETER PINX10 ENSITE X LIVEWIRE/RESPONSE

## (undated) DEVICE — SUTURE COAT VCRL SZ 4-0 L18IN ABSRB UD L19MM PS-2 1/2 CIR J496G

## (undated) DEVICE — DEVON™ KNEE AND BODY STRAP 60" X 3" (1.5 M X 7.6 CM): Brand: DEVON